# Patient Record
Sex: MALE | Race: BLACK OR AFRICAN AMERICAN | Employment: OTHER | URBAN - METROPOLITAN AREA
[De-identification: names, ages, dates, MRNs, and addresses within clinical notes are randomized per-mention and may not be internally consistent; named-entity substitution may affect disease eponyms.]

---

## 2017-02-02 ENCOUNTER — TELEPHONE (OUTPATIENT)
Dept: CARDIOLOGY CLINIC | Age: 62
End: 2017-02-02

## 2017-02-02 NOTE — TELEPHONE ENCOUNTER
Faxed device clearance form to [facility] at number [phone] and [phone]. Fax conformation received from both.

## 2017-02-08 ENCOUNTER — OFFICE VISIT (OUTPATIENT)
Dept: CARDIOLOGY CLINIC | Age: 62
End: 2017-02-08

## 2017-02-08 ENCOUNTER — OFFICE VISIT (OUTPATIENT)
Dept: SURGERY | Age: 62
End: 2017-02-08

## 2017-02-08 VITALS
OXYGEN SATURATION: 96 % | DIASTOLIC BLOOD PRESSURE: 70 MMHG | SYSTOLIC BLOOD PRESSURE: 146 MMHG | RESPIRATION RATE: 16 BRPM | BODY MASS INDEX: 31.1 KG/M2 | HEART RATE: 64 BPM | WEIGHT: 210 LBS | TEMPERATURE: 98 F | HEIGHT: 69 IN

## 2017-02-08 VITALS
OXYGEN SATURATION: 98 % | HEART RATE: 68 BPM | BODY MASS INDEX: 31.1 KG/M2 | HEIGHT: 69 IN | DIASTOLIC BLOOD PRESSURE: 64 MMHG | WEIGHT: 210 LBS | RESPIRATION RATE: 16 BRPM | SYSTOLIC BLOOD PRESSURE: 120 MMHG

## 2017-02-08 DIAGNOSIS — E66.01 MORBID OBESITY DUE TO EXCESS CALORIES (HCC): Primary | ICD-10-CM

## 2017-02-08 DIAGNOSIS — I44.1 SECOND DEGREE AV BLOCK, MOBITZ TYPE II: Primary | ICD-10-CM

## 2017-02-08 DIAGNOSIS — Z98.84 LAP-BAND SURGERY STATUS: ICD-10-CM

## 2017-02-08 DIAGNOSIS — Z46.51 ENCOUNTER FOR FITTING AND ADJUSTMENT OF GASTRIC LAP BAND: ICD-10-CM

## 2017-02-08 NOTE — MR AVS SNAPSHOT
Visit Information Date & Time Provider Department Dept. Phone Encounter #  
 2/8/2017 10:40 AM Molina Sawyer MD [facility]ub[facility] Guajardo [phone] [id number] Your Appointments 2/8/2017  2:20 PM  
Any with Deyvi Beckford NP  
[facility] 22 876 ([address]) Appt Note: ep, band adjusted $0.0co , tc 02/06/2017 [address] [facility] 63 [facility] [address]  
[address] [phone]  
  
    
 3/30/2017  9:30 AM  
PACEMAKER with Romulo Marshall [facility] ([facility]) Appt Note: 1 year f/u with device check [address] [place]  
[phone]  
  
   
 [address]  
  
    
 3/30/2017  9:40 AM  
ESTABLISHED PATIENT with Lexi Eaton MD  
[facility] ([address]) Appt Note: 1 year f/u with device check [address]  [address] [place]  
[address] [address] Upcoming Health Maintenance Date Due Hepatitis C Screening 1955 Pneumococcal 19-64 Highest Risk (1 of 3 - PCV13) 7/25/1974 DTaP/Tdap/Td series (1 - Tdap) 7/25/1976 FOBT Q 1 YEAR AGE 50-75 7/25/2005 ZOSTER VACCINE AGE 60> 7/25/2015 INFLUENZA AGE 9 TO ADULT 8/1/2016 Allergies as of 2/8/2017  Review Complete On: 2/8/2017 By: Olivia Herring No Known Allergies Current Immunizations  Never Reviewed No immunizations on file. Not reviewed this visit You Were Diagnosed With   
  
 Codes Comments Second degree AV block, Mobitz type II    -  Primary ICD-10-CM: I44.1 ICD-9-CM: 426.12 Vitals BP Pulse Resp Height(growth percentile) Weight(growth percentile) SpO2  
 120/64 (BP 1 Location: Left arm, BP Patient Position: Sitting) 68 16 5' 9\" (1.753 m) 210 lb (95.3 kg) 98% BMI Smoking Status 31.01 kg/m2 Never Smoker BMI and BSA Data Body Mass Index Body Surface Area 31.01 kg/m 2 2.15 m 2 Preferred Pharmacy Pharmacy Name Phone [facility]Y [facility][id number] [place], [place] - [address] [phone] Your Updated Medication List  
  
   
This list is accurate as of: 2/8/17 12:03 PM.  Always use your most recent med list.  
  
  
  
  
 acetaminophen 325 mg tablet Commonly known as:  TYLENOL Take 2 Tabs by mouth every six (6) hours. Indications: PAIN  
  
 amLODIPine 5 mg tablet Commonly known as:  [place] TAKE 1 TABLET EVERY DAY  
  
 cholecalciferol (vitamin D3) 2,000 unit Tab Take  by mouth daily. FOLBEE PLUS Tab tablet Generic drug:  b complex-vitamin c-folic acid 5mg Take 1 Tab by mouth daily. metFORMIN 500 mg tablet Commonly known as:  GLUCOPHAGE  
250 mg two (2) times a day. mycophenolate mofetil 250 mg capsule Commonly known as:  CELLCEPT  
daily. One Touch Delica 33 gauge Misc Generic drug:  lancets ONETOUCH ULTRA TEST strip Generic drug:  glucose blood VI test strips  
  
 predniSONE 5 mg tablet Commonly known as:  DELTASONE  
5 mg daily. SENSIPAR 30 mg tablet Generic drug:  cinacalcet  
daily. tacrolimus 1 mg capsule Commonly known as:  PROGRAF Take 2 mg by mouth every twelve (12) hours. Anti rejection drug We Performed the Following AMB POC EKG ROUTINE W/ 12 LEADS, INTER & REP [95886 CPT(R)] Patient Instructions Follow up with Stefany Felix in 6 months Introducing [facility] & HE[facility] SERVICES! Geno Lowery introduces [employer] patient portal. Now you can access parts of your medical record, email your doctor's office, and request medication refills online. 1. In your internet browser, go to [email or web address] 2. Click on the First Time User? Click Here link in the Sign In box.  You will see the New Member Sign Up page. 3. Enter your [email or web address] Access Code exactly as it appears below. You will not need to use this code after youve completed the sign-up process. If you do not sign up before the expiration date, you must request a new code. · [email or web address] Access Code: [email or web address] Expires: 3/22/2017  9:10 AM 
 
4. Enter the last four digits of your Social Security Number (xxxx) and Date of Birth (mm/dd/yyyy) as indicated and click Submit. You will be taken to the next sign-up page. 5. Create a [email or web address] ID. This will be your [email or web address] login ID and cannot be changed, so think of one that is secure and easy to remember. 6. Create a [email or web address] password. You can change your password at any time. 7. Enter your Password Reset Question and Answer. This can be used at a later time if you forget your password. 8. Enter your e-mail address. You will receive e-mail notification when new information is available in [address] 9. Click Sign Up. You can now view and download portions of your medical record. 10. Click the Download Summary menu link to download a portable copy of your medical information. If you have questions, please visit the Frequently Asked Questions section of the [email or web address] website. Remember, [email or web address] is NOT to be used for urgent needs. For medical emergencies, dial 911. Now available from your iPhone and Android! Please provide this summary of care documentation to your next provider. Your primary care clinician is listed as [address]. If you have any questions after today's visit, please call [phone].

## 2017-02-08 NOTE — PROGRESS NOTES
1. Have you been to the ER, urgent care clinic since your last visit? Hospitalized since your last visit? No    2. Have you seen or consulted any other health care providers outside of the [address] [place] [address] since your last visit? Include any pap smears or colon screening.  No

## 2017-02-08 NOTE — MR AVS SNAPSHOT
Visit Information Date & Time Provider Department Dept. Phone Encounter #  
 2/8/2017  2:20 PM Poornima Manzanares NP [address] [phone] [id number] Follow-up Instructions Return in about 6 weeks (around 3/22/2017). Your Appointments 3/30/2017  9:30 AM  
PACEMAKER with [facility]ER[facility] ([facility] [facility]) Appt Note: 1 year f/u with device check [address] [place] [address]  
[phone]  
  
   
 [address]  
  
    
 3/30/2017  9:40 AM  
ESTABLISHED PATIENT with Deepthi Sanchez MD  
[facility] Appt Note: 1 year f/u with device check [address] [address]  
[address] [address]  
  
    
 8/11/2017  8:20 AM  
ESTABLISHED PATIENT with Kayla Arauz MD  
[facility] ([facility] Appt Note: 6 Month Follow Up  
 [address]  
[address] [address] Upcoming Health Maintenance Date Due Hepatitis C Screening 1955 Pneumococcal 19-64 Highest Risk (1 of 3 - PCV13) 7/25/1974 DTaP/Tdap/Td series (1 - Tdap) 7/25/1976 FOBT Q 1 YEAR AGE 50-75 7/25/2005 ZOSTER VACCINE AGE 60> 7/25/2015 INFLUENZA AGE 9 TO ADULT 8/1/2016 Allergies as of 2/8/2017  Review Complete On: 2/8/2017 By: Poornima Manzanares NP No Known Allergies Current Immunizations  Never Reviewed No immunizations on file. Not reviewed this visit You Were Diagnosed With   
  
 Codes Comments Morbid obesity due to excess calories ([facility] [address]    -  Primary ICD-10-CM: E66.01 
ICD-9-CM: 278.01   
 LAP-BAND surgery status     ICD-10-CM: Z98.84 ICD-9-CM: V45.86   
 Encounter for fitting and adjustment of gastric lap band     ICD-10-CM: Z46.51 
ICD-9-CM: V53.51 BMI 31.0-31.9,adult     ICD-10-CM: Z68.31 
ICD-9-CM: V85.31 Vitals BP Pulse Temp Resp Height(growth percentile) Weight(growth percentile) 146/70 (BP 1 Location: Left arm, BP Patient Position: Sitting) 64 98 °F (36.7 °C) (Oral) 16 5' 9\" (1.753 m) 210 lb (95.3 kg) SpO2 BMI Smoking Status 96% 31.01 kg/m2 Never Smoker BMI and BSA Data Body Mass Index Body Surface Area 31.01 kg/m 2 2.15 m 2 Preferred Pharmacy Pharmacy Name Phone [facility]/PH[facility]ACY #[id number]- [place], [place] - [address] [phone] Your Updated Medication List  
  
   
This list is accurate as of: 2/8/17  2:58 PM.  Always use your most recent med list.  
  
  
  
  
 acetaminophen 325 mg tablet Commonly known as:  TYLENOL Take 2 Tabs by mouth every six (6) hours. Indications: PAIN  
  
 amLODIPine 5 mg tablet Commonly known as:  [place] TAKE 1 TABLET EVERY DAY  
  
 cholecalciferol (vitamin D3) 2,000 unit Tab Take  by mouth daily. FOLBEE PLUS Tab tablet Generic drug:  b complex-vitamin c-folic acid 5mg Take 1 Tab by mouth daily. metFORMIN 500 mg tablet Commonly known as:  GLUCOPHAGE  
250 mg two (2) times a day. mycophenolate mofetil 250 mg capsule Commonly known as:  CELLCEPT  
daily. One Touch Delica 33 gauge Misc Generic drug:  lancets ONETOUCH ULTRA TEST strip Generic drug:  glucose blood VI test strips  
  
 predniSONE 5 mg tablet Commonly known as:  DELTASONE  
5 mg daily. SENSIPAR 30 mg tablet Generic drug:  cinacalcet  
daily. tacrolimus 1 mg capsule Commonly known as:  PROGRAF Take 2 mg by mouth every twelve (12) hours. Anti rejection drug We Performed the Following ADJUSTMENT GASTRIC BAND [ [facility]] Follow-up Instructions Return in about 6 weeks (around 3/22/2017). Patient Instructions Post Lap Band Adjustment Instructions Your band is now more restrictive. Some swelling can occur at the band following a fill. Therefore, you should eat: 
 
Full liquid diet for 12-24 hours, then Soft and mushy foods for 2 days, then Resume regular food on the 4 th day. All meals should fit in a 4 ounce cup. (1/2 cup container) Choose food that require chewing, ideally foods rich in fiber and protein Avoid fatty and sugary food. Avoid snack food items. Eating Tips: 
Put down the fork between bites. Do not talk and eat at the same time. No drinking 30 minutes before or one hour after a meal. 
 
Call for an evaluation if you develop new reflux (heartburn), a night time cough or inability to tolerate solid foods. Our next regular follow- up appointment Introducing [facility] & HE[facility]! [facility] introduces [employer] patient portal. Now you can access parts of your medical record, email your doctor's office, and request medication refills online. 1. In your internet browser, go to [email or web address] 2. Click on the First Time User? Click Here link in the Sign In box. You will see the New Member Sign Up page. 3. Enter your [employer] Access Code exactly as it appears below. You will not need to use this code after youve completed the sign-up process. If you do not sign up before the expiration date, you must request a new code. · [employer] Access Code: [email or web address] Expires: 3/22/2017  9:10 AM 
 
4. Enter the last four digits of your Social Security Number (xxxx) and Date of Birth (mm/dd/yyyy) as indicated and click Submit. You will be taken to the next sign-up page. 5. Create a EsLifet ID. This will be your [employer] login ID and cannot be changed, so think of one that is secure and easy to remember. 6. Create a EsLifet password. You can change your password at any time. 7. Enter your Password Reset Question and Answer. This can be used at a later time if you forget your password. 8. Enter your e-mail address. You will receive e-mail notification when new information is available in [address] 9. Click Sign Up. You can now view and download portions of your medical record. 10. Click the Download Summary menu link to download a portable copy of your medical information. If you have questions, please visit the Frequently Asked Questions section of the Lyft website. Remember, [employer] is NOT to be used for urgent needs. For medical emergencies, dial 911. Now available from your iPhone and Android! Please provide this summary of care documentation to your next provider. Your primary care clinician is listed as [address]. If you have any questions after today's visit, please call [phone].

## 2017-02-08 NOTE — PATIENT INSTRUCTIONS
Post Lap Band Adjustment Instructions  Your band is now more restrictive. Some swelling can occur at the band following a fill. Therefore, you should eat:    Full liquid diet for 12-24 hours, then  Soft and mushy foods for 2 days, then  Resume regular food on the 4 th day. All meals should fit in a 4 ounce cup. (1/2 cup container)  Choose food that require chewing, ideally foods rich in fiber and protein    Avoid fatty and sugary food. Avoid snack food items. Eating Tips:  Put down the fork between bites. Do not talk and eat at the same time. No drinking 30 minutes before or one hour after a meal.    Call for an evaluation if you develop new reflux (heartburn), a night time cough or inability to tolerate solid foods.     Our next regular follow- up appointment

## 2017-02-08 NOTE — PROGRESS NOTES
Merly Cruz is a 64 y.o. male 8 years status post lap gastric banding, down 72 pounds. Weight today is 210 pounds. Patient stated wants to get back on track with weight loss. \" I have been eating badly and not exercising. \"  No nausea/vomiting, no heartburn/reflux, no night-time cough, no fever, no chills, No shortness of breath, no chest pain, no abdominal pain. Denies dysphagia. Portion control but no measuring meals. Snacking on chips and sweets. Drinking at least 40 ounces of water daily. Admits to eating/drinking at the same time. Bowel movements daily that are formed. Patient states he does need an adjustment because having hunger and portions are larger. HPI    Review of Systems   Constitutional: Negative for chills, fever and malaise/fatigue. Respiratory: Negative for cough, sputum production and shortness of breath. Cardiovascular: Negative for chest pain, palpitations and leg swelling. Gastrointestinal: Negative for abdominal pain, constipation, diarrhea, heartburn, nausea and vomiting. Genitourinary: Negative for dysuria. Neurological: Negative for dizziness. Physical Exam   Constitutional: He is oriented to person, place, and time. He appears well-developed and well-nourished. No distress. Abdominal: Soft. Bowel sounds are normal. He exhibits no distension. There is no tenderness. There is no rebound and no guarding. Lap sites healed. Port is palpable   Musculoskeletal: Normal range of motion. He exhibits no edema. Neurological: He is alert and oriented to person, place, and time. Skin: Skin is warm and dry. No rash noted. No erythema. Psychiatric: He has a normal mood and affect. His behavior is normal. Thought content normal.       ASSESSMENT and PLAN  Morbid Obesity 8 years status post lap gastric banding, down 72 pounds. Weight today is 210 pounds. With verbal consent an office adjustment was performed today. Patient was placed in standing position.  Abdomen was prepped using sterile technique, Suh needle easily accessed band. A total of 6 ml of normal saline was found in band, an additional .75 ml was added to band. Patient has 6.75 ml of normal saline in band. Patient then tolerated 6-8 oz fluid bolus with productive belch. Advised to stay on full diet for 12-24 hours, then advance diet as tolerated. If developed nausea, vomiting, dysphagia, heartburn, reflux, and/or nighttime cough advised to call office for possible removal of some fluid out of band. Yet if tolerating diet patient to follow up in office in 4 to 6 weeks. Advised patient to continue with diet that is high-protein, low-fat, low-sugar, and measuring 4 ounces at each meal. Also continue hydrating with at least  40 ounces of no-calorie, non-carbonated beverages. Advised to call office if any questions/concerns.

## 2017-02-08 NOTE — PROGRESS NOTES
HISTORY OF PRESENT ILLNESS  Yehuda Luke is a 64 y.o. male. Ms. Brien Aguirre is a very pleasant 64years old gentleman with a past medical history remarkable for hypertension, insulin dependent diabetes mellitus, renal insufficiency on hemodialysis, who returns today for follow up. The patient has undergone successful laparoscopic band surgery without any problems. He has lost 170 pounds since 2008. He has received renal transplant in 2014   Echo on 12/14 At [facility], severe LVH, EF 60-65% trivial mr tr and ai  ETT on 12/14 at u negative for myocardium at ischemic risk   HOLTER on 9/24/15: NSR, FAV, BBB, 32-82. Frequent 2nd degree av block mobitz 1, rare pvc's and frequent pac's and rare bigeminy    Following this with Dr. Marlen Palm: Implantation of dual-chamber pacemaker with contrast venography and fluoroscopy. current pacemaker mode and does not need RA lead reposition  He needs V pacing only  HPI  No cp or sob doing very well  Exercising at least 3-4 times a week on TM with no symptoms at all  Review of Systems   Respiratory: Negative. Cardiovascular: Negative. Visit Vitals    /64 (BP 1 Location: Left arm, BP Patient Position: Sitting)    Pulse 68    Resp 16    Ht 5' 9\" (1.753 m)    Wt 95.3 kg (210 lb)    SpO2 98%    BMI 31.01 kg/m2       Physical Exam   Neck: No JVD present. Carotid bruit is not present. Cardiovascular: Normal rate and regular rhythm. Pulmonary/Chest: Effort normal and breath sounds normal.   Abdominal: Soft. Musculoskeletal: He exhibits no edema. Psychiatric: He has a normal mood and affect. Current Outpatient Prescriptions on File Prior to Visit   Medication Sig Dispense Refill    amLODIPine (NORVASC) 5 mg tablet TAKE 1 TABLET EVERY DAY 30 Tab 5    SENSIPAR 30 mg tablet daily. 3    mycophenolate (CELLCEPT) 250 mg capsule daily.  acetaminophen (TYLENOL) 325 mg tablet Take 2 Tabs by mouth every six (6) hours. Indications: PAIN 30 Tab 0    ONETOUCH ULTRA TEST strip       ONE TOUCH DELICA 33 gauge misc   1    metFORMIN (GLUCOPHAGE) 500 mg tablet 250 mg two (2) times a day.  cholecalciferol, vitamin D3, 2,000 unit tab Take  by mouth daily.  predniSONE (DELTASONE) 5 mg tablet 5 mg daily.  tacrolimus (PROGRAF) 1 mg capsule Take 2 mg by mouth every twelve (12) hours. Anti rejection drug      b complex-vitamin c-folic acid 5mg (FOLBEE PLUS) Tab tablet Take 1 Tab by mouth daily. No current facility-administered medications on file prior to visit. ASSESSMENT and PLAN  SSS: with PPM no particular issues. His ECG shows NSR and V paced. HTN: well controlled  HLD : closely followed by his PCP  Preop: patient remains completely asymptomatic cardiac wise.  Nuclear stress test a bit more than 2 years ago with no ischemia  At this time he is at an acceptable cardiac risk to proceed with penile prosthesis replacement as planned  No additional interventions for now  I will see him back in 6 months or sooner if any issues

## 2017-04-05 ENCOUNTER — HOSPITAL ENCOUNTER (OUTPATIENT)
Dept: GENERAL RADIOLOGY | Age: 62
Discharge: HOME | End: 2017-04-05
Payer: MEDICARE

## 2017-04-05 ENCOUNTER — TELEPHONE (OUTPATIENT)
Dept: CARDIOLOGY CLINIC | Age: 62
End: 2017-04-05

## 2017-04-05 ENCOUNTER — OFFICE VISIT (OUTPATIENT)
Dept: CARDIOLOGY CLINIC | Age: 62
End: 2017-04-05

## 2017-04-05 ENCOUNTER — CLINICAL SUPPORT (OUTPATIENT)
Dept: CARDIOLOGY CLINIC | Age: 62
End: 2017-04-05

## 2017-04-05 VITALS
SYSTOLIC BLOOD PRESSURE: 132 MMHG | HEART RATE: 60 BPM | HEIGHT: 69 IN | DIASTOLIC BLOOD PRESSURE: 64 MMHG | WEIGHT: 198 LBS | BODY MASS INDEX: 29.33 KG/M2

## 2017-04-05 DIAGNOSIS — I44.1 SECOND DEGREE AV BLOCK, MOBITZ TYPE II: ICD-10-CM

## 2017-04-05 DIAGNOSIS — Z95.0 S/P PLACEMENT OF CARDIAC PACEMAKER: ICD-10-CM

## 2017-04-05 DIAGNOSIS — Z94.0 RENAL TRANSPLANT RECIPIENT: ICD-10-CM

## 2017-04-05 DIAGNOSIS — Z95.0 CARDIAC PACEMAKER IN SITU: Primary | ICD-10-CM

## 2017-04-05 PROCEDURE — 71020 XR CHEST PA LAT: CPT

## 2017-04-05 NOTE — PROGRESS NOTES
Cardiac Electrophysiology Office Note     Subjective: Valdo Montesinos is a 64 y.o. man who is here today for follow up because Biotronik pacer    It is known no capture of atrial lead as xray had shown A lead dislodged in 2015 and was hanging in RA for sensing  He is s/p total knee replacement with Dr Hope Sanford 11/23/15. He had atrial lead dislodged on xray in Nov 2015 but does not need reposition after VDD mode allowed him to do well  He is doing okay after his knee replacement and is going to have left shoulder surgery coming up. He has no symptom change  No dizziness, syncope       S/p dual chamber pacemaker (Biotronik Setrox) 10/29/15. He has a history of kidney transplant. He has a right arm fistula. He lost about 157 pounds with diet. He has severe left knee arthritis and wanted to have surgery done. He had shortness of breath on exertion, tired easily. In the past when he went for surgery, it was postponed because of bradycardia. EKG show that he has first degree AV block and bifascicular block. He had a Holter monitor that showed second degree AV block with severe bradycardia.       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 3    mycophenolate (CELLCEPT) 250 mg capsule Take 250 mg by mouth daily.  acetaminophen (TYLENOL) 325 mg tablet Take 2 Tabs by mouth every six (6) hours. Indications: PAIN 30 Tab 0    ONETOUCH ULTRA TEST strip       ONE TOUCH DELICA 33 gauge misc   1    metFORMIN (GLUCOPHAGE) 500 mg tablet 250 mg two (2) times a day.  cholecalciferol, vitamin D3, 2,000 unit tab Take  by mouth daily.  predniSONE (DELTASONE) 5 mg tablet 5 mg daily.  tacrolimus (PROGRAF) 1 mg capsule Take 2 mg by mouth every twelve (12) hours. Anti rejection drug      b complex-vitamin c-folic acid 5mg (FOLBEE PLUS) Tab tablet Take 1 Tab by mouth daily. No Known Allergies  Past Medical History:   Diagnosis Date    Chronic pain     Diabetes ([address]     h/o Gastric band     h/o successful Renal transplant 2014    Hypertension     Pacemaker 10/2015     Past Surgical History:   Procedure Laterality Date    HX PACEMAKER  10/29/15    HX RENAL TRANSPLANT  2-16-14    [facility]    HX RENAL TRANSPLANT Right 2/2014    INS PPM/ICD LED DUAL ONLY  10/29/2015         LAP, PLACE ADJUST GASTR BAND      PENILE PUMP       No family history of pacemaker   Social History   Substance Use Topics    Smoking status: Never Smoker    Smokeless tobacco: Never Used    Alcohol use No        Review of Systems:   Constitutional: Negative for fever, chills, weight loss   HEENT: Negative for nosebleeds, vision changes. Respiratory: Negative for cough, hemoptysis, sputum production, and wheezing. Cardiovascular: Negative for chest pain, palpitations, orthopnea, claudication, leg swelling, syncope, and PND. Gastrointestinal: Negative for nausea, vomiting, diarrhea, constipation, blood in stool and melena. Genitourinary: Negative for dysuria, and hematuria. Musculoskeletal: Negative for myalgias,  arthralgia . Skin: Negative for rash. Heme: Does not bleed or bruise easily.    Neurological: Negative for speech change and focal weakness     Objective:     Visit Vitals    /64 (BP 1 Location: Left arm, BP Patient Position: Sitting)    Pulse 60    Ht 5' 9\" (1.753 m)    Wt 198 lb (89.8 kg)    BMI 29.24 kg/m2      Physical Exam:   Constitutional: well-developed and well-nourished. No distress. Head: Normocephalic and atraumatic. Eyes: Pupils are equal, round  Neck: supple. No JVD present. Cardiovascular: Normal rate, regular rhythm and normal heart sounds. Exam reveals no gallop and no friction rub. No murmur heard. Pulmonary/Chest: Effort normal and breath sounds normal. No wheezes. Abdominal: Soft, no tenderness. Musculoskeletal: no edema. right arm old AV fistula  Neurological: alert,oriented. Skin: Skin is warm and dry. Left side pacemaker site healed/ unremarkable,   Psychiatric: normal mood and affect. Behavior is normal. Judgment and thought content normal.          Assessment/Plan:       ICD-10-CM ICD-9-CM    1. Cardiac pacemaker in situ Z95.0 V45.01    2. S/P placement of cardiac pacemaker Z95.0 V45.01 XR CHEST PA LAT   3. Second degree AV block, Mobitz type II I44.1 426.12    4. Renal transplant recipient Z94.0 V42.0      reviewed pacemaker check and he is comfortable with keeping the current pacemaker mode and does not need RA lead reposition but it showed some high V rate and he said he usually exercise those time  They were 6 seconds  He needs V pacing only  His sinus node allows his rate response   I recommend to check xray to make sure RA lead did not fall to RV and if it does, will need to remove and replace  and he will see Dr Hiram Chris 6 months and me in a year if chest xray shows no change  If symptoms change he calls me  He checks pacer every 3 months remotely and once a year in office    Carlos Yeager M.D.   Electrophysiology/Cardiology  [facility]  [address] , [address]  [address] [facility] [address]  [phone]                                        [phone]

## 2017-04-05 NOTE — TELEPHONE ENCOUNTER
----- Message from Callum Burkett NP sent at 4/5/2017  4:33 PM EDT -----  Right atrial lead position the same at 11/2015  The RA lead was previously dislodged after knee surgery 11/2015  Atrial lead appropriately sensing   No further intervention at this time

## 2017-04-05 NOTE — PROGRESS NOTES
Chief Complaint   Patient presents with    Slow Heart Rate     second degree heart block    Pacemaker Check    Follow-up     annual follow up     Verified patient with two types of identifiers. Verified medications with the patient. Verified pharmacy with patient.

## 2017-04-05 NOTE — MR AVS SNAPSHOT
Visit Information Date & Time Provider Department Dept. Phone Encounter #  
 4/5/2017  9:40 AM Hope Quevedo MD [facility] [phone] [id number] Your Appointments 8/11/2017  8:20 AM  
ESTABLISHED PATIENT with Darcy Ortiz MD  
[facility] ([address]) Appt Note: 6 Month Follow Up  
 [address] [place]  
[address] [address]  
  
    
 4/27/2018  9:00 AM  
PACEMAKER with Areli Sellers [facility] ([facility]) Appt Note: bio threshold/rc/Dunn 1 yr b HM  
 [address] [place]  
[address] [facility]  
  
    
 4/27/2018  9:20 AM  
ESTABLISHED PATIENT with Hope Quevedo MD  
[facility] ([address]) Appt Note: bio threshold/rc/Dunn 1 yr b HM  
 [address] [facility] [address]  
[address] [address]  
  
    
  
 7/11/2017 10:00 AM  
REMOTE OFFICE VISIT with Marleen Segura [facility] ([facility]) Appt Note: HM PPI/rc b 4-5-17  
 [address] [place]  
[address] [address]  
  
    
 10/16/2017  9:00 AM  
REMOTE OFFICE VISIT with Marleen Segura [facility] ([facility]) Appt Note: HM PPI/rc b  
 [address] [place]  
[phone]  
  
    
 1/22/2018  8:15 AM  
REMOTE OFFICE VISIT with Marleen Segura [facility] ([facility]) Appt Note: HM PPI/rc b  
 [address] [place]  
[phone] Upcoming Health Maintenance Date Due Hepatitis C Screening 1955 Pneumococcal 19-64 Highest Risk (1 of 3 - PCV13) 7/25/1974 DTaP/Tdap/Td series (1 - Tdap) 7/25/1976 FOBT Q 1 YEAR AGE 50-75 7/25/2005 ZOSTER VACCINE AGE 60> 7/25/2015 INFLUENZA AGE 9 TO ADULT 8/1/2016 Allergies as of 4/5/2017  Review Complete On: 4/5/2017 By: Devin Chavez MD  
 No Known Allergies Current Immunizations  Never Reviewed No immunizations on file. Not reviewed this visit You Were Diagnosed With   
  
 Codes Comments S/P placement of cardiac pacemaker    -  Primary ICD-10-CM: Z95.0 ICD-9-CM: V45.01 Vitals BP Pulse Height(growth percentile) Weight(growth percentile) BMI Smoking Status 132/64 (BP 1 Location: Left arm, BP Patient Position: Sitting) 60 5' 9\" (1.753 m) 198 lb (89.8 kg) 29.24 kg/m2 Never Smoker Vitals History BMI and BSA Data Body Mass Index Body Surface Area  
 29.24 kg/m 2 2.09 m 2 Preferred Pharmacy Pharmacy Name Phone [id number] [address] [phone] Your Updated Medication List  
  
   
This list is accurate as of: 4/5/17 10:26 AM.  Always use your most recent med list.  
  
  
  
  
 acetaminophen 325 mg tablet Commonly known as:  TYLENOL Take 2 Tabs by mouth every six (6) hours. Indications: PAIN  
  
 amLODIPine 5 mg tablet Commonly known as:  Sherald Burkitt TAKE 1 TABLET EVERY DAY  
  
 cholecalciferol (vitamin D3) 2,000 unit Tab Take  by mouth daily. FOLBEE PLUS Tab tablet Generic drug:  b complex-vitamin c-folic acid 5mg Take 1 Tab by mouth daily. metFORMIN 500 mg tablet Commonly known as:  GLUCOPHAGE  
250 mg two (2) times a day. mycophenolate mofetil 250 mg capsule Commonly known as:  CELLCEPT Take 250 mg by mouth daily. One Touch Delica 33 gauge Misc Generic drug:  lancets ONETOUCH ULTRA TEST strip Generic drug:  glucose blood VI test strips  
  
 predniSONE 5 mg tablet Commonly known as:  DELTASONE  
5 mg daily. SENSIPAR 30 mg tablet Generic drug:  cinacalcet Take 30 mg by mouth daily. tacrolimus 1 mg capsule Commonly known as:  PROGRAF Take 2 mg by mouth every twelve (12) hours. Anti rejection drug To-Do List   
 Around 04/05/2017 Imaging:  XR CHEST PA LAT Patient Instructions Have chest x-ray to evaluate atrial lead placement. Introducing [facility]! Eder Moise introduces Samplesaint patient portal. Now you can access parts of your medical record, email your doctor's office, and request medication refills online. 1. In your internet browser, go to [email or web address] 2. Click on the First Time User? Click Here link in the Sign In box. You will see the New Member Sign Up page. 3. Enter your Samplesaint Access Code exactly as it appears below. You will not need to use this code after youve completed the sign-up process. If you do not sign up before the expiration date, you must request a new code. · Samplesaint Access Code: [email or web address] Expires: 7/4/2017 10:26 AM 
 
4. Enter the last four digits of your Social Security Number (xxxx) and Date of Birth (mm/dd/yyyy) as indicated and click Submit. You will be taken to the next sign-up page. 5. Create a Samplesaint ID. This will be your Samplesaint login ID and cannot be changed, so think of one that is secure and easy to remember. 6. Create a Samplesaint password. You can change your password at any time. 7. Enter your Password Reset Question and Answer. This can be used at a later time if you forget your password. 8. Enter your e-mail address. You will receive e-mail notification when new information is available in [address] 9. Click Sign Up. You can now view and download portions of your medical record. 10. Click the Download Summary menu link to download a portable copy of your medical information. If you have questions, please visit the Frequently Asked Questions section of the [employer] website. Remember, [employer] is NOT to be used for urgent needs. For medical emergencies, dial 911. Now available from your iPhone and Android! Please provide this summary of care documentation to your next provider. Your primary care clinician is listed as [address]. If you have any questions after today's visit, please call [phone].

## 2017-04-06 NOTE — TELEPHONE ENCOUNTER
Verified patient with two types of identifiers. Notified of results. Patient verbalizes understanding. And will call with any questions or concerns.

## 2017-04-25 ENCOUNTER — ANESTHESIA (OUTPATIENT)
Dept: ENDOSCOPY | Age: 62
End: 2017-04-25
Payer: MEDICARE

## 2017-04-25 ENCOUNTER — HOSPITAL ENCOUNTER (OUTPATIENT)
Age: 62
Setting detail: OUTPATIENT SURGERY
Discharge: HOME | End: 2017-04-25
Attending: SPECIALIST | Admitting: SPECIALIST
Payer: MEDICARE

## 2017-04-25 ENCOUNTER — ANESTHESIA EVENT (OUTPATIENT)
Dept: ENDOSCOPY | Age: 62
End: 2017-04-25
Payer: MEDICARE

## 2017-04-25 VITALS
WEIGHT: 198 LBS | RESPIRATION RATE: 19 BRPM | DIASTOLIC BLOOD PRESSURE: 80 MMHG | OXYGEN SATURATION: 97 % | HEART RATE: 70 BPM | TEMPERATURE: 97.8 F | BODY MASS INDEX: 31.08 KG/M2 | HEIGHT: 67 IN | SYSTOLIC BLOOD PRESSURE: 125 MMHG

## 2017-04-25 PROCEDURE — 88305 TISSUE EXAM BY PATHOLOGIST: CPT | Performed by: SPECIALIST

## 2017-04-25 PROCEDURE — 77030027957 HC TBNG IRR ENDOGTR BUSS -B: Performed by: SPECIALIST

## 2017-04-25 PROCEDURE — 74011250636 HC RX REV CODE- 250/636

## 2017-04-25 PROCEDURE — 76060000031 HC ANESTHESIA FIRST 0.5 HR: Performed by: SPECIALIST

## 2017-04-25 PROCEDURE — 77030009426 HC FCPS BIOP ENDOSC BSC -B: Performed by: SPECIALIST

## 2017-04-25 PROCEDURE — 76040000019: Performed by: SPECIALIST

## 2017-04-25 PROCEDURE — 74011000250 HC RX REV CODE- 250

## 2017-04-25 RX ORDER — ASPIRIN 81 MG/1
81 TABLET ORAL DAILY
COMMUNITY

## 2017-04-25 RX ORDER — EPINEPHRINE 0.1 MG/ML
1 INJECTION INTRACARDIAC; INTRAVENOUS
Status: DISCONTINUED | OUTPATIENT
Start: 2017-04-25 | End: 2017-04-25 | Stop reason: HOSPADM

## 2017-04-25 RX ORDER — SODIUM CHLORIDE 9 MG/ML
INJECTION, SOLUTION INTRAVENOUS
Status: DISCONTINUED | OUTPATIENT
Start: 2017-04-25 | End: 2017-04-25 | Stop reason: HOSPADM

## 2017-04-25 RX ORDER — MIDAZOLAM HYDROCHLORIDE 1 MG/ML
.25-1 INJECTION, SOLUTION INTRAMUSCULAR; INTRAVENOUS
Status: DISCONTINUED | OUTPATIENT
Start: 2017-04-25 | End: 2017-04-25 | Stop reason: HOSPADM

## 2017-04-25 RX ORDER — FENTANYL CITRATE 50 UG/ML
200 INJECTION, SOLUTION INTRAMUSCULAR; INTRAVENOUS
Status: DISCONTINUED | OUTPATIENT
Start: 2017-04-25 | End: 2017-04-25 | Stop reason: HOSPADM

## 2017-04-25 RX ORDER — DEXTROMETHORPHAN/PSEUDOEPHED 2.5-7.5/.8
1.2 DROPS ORAL
Status: DISCONTINUED | OUTPATIENT
Start: 2017-04-25 | End: 2017-04-25 | Stop reason: HOSPADM

## 2017-04-25 RX ORDER — LIDOCAINE HYDROCHLORIDE 20 MG/ML
INJECTION, SOLUTION EPIDURAL; INFILTRATION; INTRACAUDAL; PERINEURAL AS NEEDED
Status: DISCONTINUED | OUTPATIENT
Start: 2017-04-25 | End: 2017-04-25 | Stop reason: HOSPADM

## 2017-04-25 RX ORDER — NALOXONE HYDROCHLORIDE 0.4 MG/ML
0.4 INJECTION, SOLUTION INTRAMUSCULAR; INTRAVENOUS; SUBCUTANEOUS
Status: DISCONTINUED | OUTPATIENT
Start: 2017-04-25 | End: 2017-04-25 | Stop reason: HOSPADM

## 2017-04-25 RX ORDER — SODIUM CHLORIDE 0.9 % (FLUSH) 0.9 %
5-10 SYRINGE (ML) INJECTION AS NEEDED
Status: DISCONTINUED | OUTPATIENT
Start: 2017-04-25 | End: 2017-04-25 | Stop reason: HOSPADM

## 2017-04-25 RX ORDER — ATROPINE SULFATE 0.1 MG/ML
0.5 INJECTION INTRAVENOUS
Status: DISCONTINUED | OUTPATIENT
Start: 2017-04-25 | End: 2017-04-25 | Stop reason: HOSPADM

## 2017-04-25 RX ORDER — SODIUM CHLORIDE 0.9 % (FLUSH) 0.9 %
5-10 SYRINGE (ML) INJECTION EVERY 8 HOURS
Status: DISCONTINUED | OUTPATIENT
Start: 2017-04-25 | End: 2017-04-25 | Stop reason: HOSPADM

## 2017-04-25 RX ORDER — PROPOFOL 10 MG/ML
INJECTION, EMULSION INTRAVENOUS AS NEEDED
Status: DISCONTINUED | OUTPATIENT
Start: 2017-04-25 | End: 2017-04-25 | Stop reason: HOSPADM

## 2017-04-25 RX ORDER — SODIUM CHLORIDE 9 MG/ML
50 INJECTION, SOLUTION INTRAVENOUS CONTINUOUS
Status: DISCONTINUED | OUTPATIENT
Start: 2017-04-25 | End: 2017-04-25 | Stop reason: HOSPADM

## 2017-04-25 RX ORDER — FLUMAZENIL 0.1 MG/ML
0.2 INJECTION INTRAVENOUS
Status: DISCONTINUED | OUTPATIENT
Start: 2017-04-25 | End: 2017-04-25 | Stop reason: HOSPADM

## 2017-04-25 RX ADMIN — PROPOFOL 25 MG: 10 INJECTION, EMULSION INTRAVENOUS at 09:28

## 2017-04-25 RX ADMIN — PROPOFOL 10 MG: 10 INJECTION, EMULSION INTRAVENOUS at 09:38

## 2017-04-25 RX ADMIN — PROPOFOL 25 MG: 10 INJECTION, EMULSION INTRAVENOUS at 09:27

## 2017-04-25 RX ADMIN — PROPOFOL 25 MG: 10 INJECTION, EMULSION INTRAVENOUS at 09:34

## 2017-04-25 RX ADMIN — PROPOFOL 50 MG: 10 INJECTION, EMULSION INTRAVENOUS at 09:23

## 2017-04-25 RX ADMIN — PROPOFOL 25 MG: 10 INJECTION, EMULSION INTRAVENOUS at 09:31

## 2017-04-25 RX ADMIN — LIDOCAINE HYDROCHLORIDE 40 MG: 20 INJECTION, SOLUTION EPIDURAL; INFILTRATION; INTRACAUDAL; PERINEURAL at 09:23

## 2017-04-25 RX ADMIN — SODIUM CHLORIDE: 9 INJECTION, SOLUTION INTRAVENOUS at 09:13

## 2017-04-25 NOTE — IP AVS SNAPSHOT
[address] 
[phone] Patient: Walt Wild MRN: [id number] DPT:6/22/[birth year]45 You are allergic to the following No active allergies Recent Documentation Height Weight BMI Smoking Status 1.702 m 89.8 kg 31.01 kg/m2 Never Smoker Emergency Contacts Name Discharge Info Relation Home Work Mobile Rachael Freeman DISCHARGE CAREGIVER [3] Spouse [3] [phone] About your hospitalization You were admitted on:  April 25, 2017 You last received care in the:  [facility] ENDOSCOPY You were discharged on:  April 25, 2017 Unit phone number:  [phone] Why you were hospitalized Your primary diagnosis was:  Not on File Providers Seen During Your Hospitalizations Provider Role Specialty Primary office phone Colleen Lopez MD Attending Provider Gastroenterology [phone] Your Primary Care Physician (PCP) Primary Care Physician Office Phone Office Fax Chele Jose Mariayusra [phone] Follow-up Information None Current Discharge Medication List  
  
CONTINUE these medications which have NOT CHANGED Dose & Instructions Dispensing Information Comments Morning Noon Evening Bedtime  
 acetaminophen 325 mg tablet Commonly known as:  TYLENOL Your last dose was: Your next dose is:    
   
   
 Dose:  650 mg Take 2 Tabs by mouth every six (6) hours. Indications: PAIN Quantity:  30 Tab Refills:  0  
     
   
   
   
  
 amLODIPine 5 mg tablet Commonly known as:  Hallie Humza Your last dose was: Your next dose is: TAKE 1 TABLET EVERY DAY Quantity:  30 Tab Refills:  5  
     
   
   
   
  
 aspirin delayed-release 81 mg tablet Your last dose was: Your next dose is:    
   
   
 Dose:  81 mg Take 81 mg by mouth daily. Refills:  0  
     
   
   
   
  
 cholecalciferol (vitamin D3) 2,000 unit Tab Your last dose was: Your next dose is: Take  by mouth daily. Refills:  0  
     
   
   
   
  
 FOLBEE PLUS Tab tablet Generic drug:  b complex-vitamin c-folic acid 5mg Your last dose was: Your next dose is:    
   
   
 Dose:  1 Tab Take 1 Tab by mouth daily. Refills:  0  
     
   
   
   
  
 metFORMIN 500 mg tablet Commonly known as:  GLUCOPHAGE Your last dose was: Your next dose is:    
   
   
 Dose:  250 mg  
250 mg two (2) times a day. Refills:  0  
     
   
   
   
  
 mycophenolate mofetil 250 mg capsule Commonly known as:  CELLCEPT Your last dose was: Your next dose is:    
   
   
 Dose:  250 mg Take 250 mg by mouth daily. Refills:  0 One Touch Delica 33 gauge Misc Generic drug:  lancets Your last dose was: Your next dose is:    
   
   
  Refills:  1 ONETOUCH ULTRA TEST strip Generic drug:  glucose blood VI test strips Your last dose was: Your next dose is:    
   
   
  Refills:  0  
     
   
   
   
  
 predniSONE 5 mg tablet Commonly known as:  Leanord Pian Your last dose was: Your next dose is:    
   
   
 Dose:  5 mg  
5 mg daily. Refills:  0 SENSIPAR 30 mg tablet Generic drug:  cinacalcet Your last dose was: Your next dose is:    
   
   
 Dose:  30 mg Take 30 mg by mouth daily. Refills:  3  
     
   
   
   
  
 tacrolimus 1 mg capsule Commonly known as:  PROGRAF Your last dose was: Your next dose is:    
   
   
 Dose:  2 mg Take 2 mg by mouth every twelve (12) hours. Anti rejection drug Refills:  0 Discharge Instructions Walt Wild [id number] 
1955 COLON DISCHARGE INSTRUCTIONS Discomfort: Redness at IV site- apply warm compress to area; if redness or soreness persist- contact your physician There may be a slight amount of blood passed from the rectum Gaseous discomfort- walking, belching will help relieve any discomfort You may not operate a vehicle for 12 hours You may not engage in an occupation involving machinery or appliances for rest of today You may not drink alcoholic beverages for at least 12 hours Avoid making any critical decisions for at least 24 hour DIET: 
 Regular diet.  however -  remember your colon is empty and a heavy meal will produce gas. Avoid these foods:  vegetables, fried / greasy foods, carbonated drinks for today. MEDICATIONS: 
  
 Regarding Aspirin or Nonsteroidal medications, please see below. ACTIVITY: 
You may resume your normal daily activities it is recommended that you spend the remainder of the day resting -  avoid any strenuous activity. CALL M.D. ANY SIGN OF: Increasing pain, nausea, vomiting Abdominal distension (swelling) New increased bleeding (oral or rectal) Fever (chills) Pain in chest area Bloody discharge from nose or mouth Shortness of breath You may not  take any Advil, Aspirin, Ibuprofen, Motrin, Aleve, or Goodys for 7 days, ONLY  Tylenol as needed for pain. Follow-up Instructions: 
 Call Dr. Treva Lewis Results of procedure / biopsy in 10 days Telephone #  [phone] DISCHARGE SUMMARY from Nurse The following personal items collected during your admission are returned to you:  
Dental Appliance: Dental Appliances: None Vision: Visual Aid: None Hearing Aid:   
Jewelry:   
Clothing:   
Other Valuables:   
Valuables sent to safe:   
 
 
 
 
  
Colon Polyps: Care Instructions Your Care Instructions Colon polyps are growths in the colon or the rectum. The cause of most colon polyps is not known, and most people who get them do not have any problems. But a certain kind can turn into cancer.  For this reason, regular testing for colon polyps is important for people age 48 and older and anyone who has an increased risk for colon cancer. Polyps are usually found through routine colon cancer screening tests. Although most colon polyps are not cancerous, they are usually removed and then tested for cancer. Screening for colon cancer saves lives because the cancer can usually be cured if it is caught early. If you have a polyp that is the type that can turn into cancer, you may need more tests to examine your entire colon. The doctor will remove any other polyps that he or she finds, and you will be tested more often. Follow-up care is a key part of your treatment and safety. Be sure to make and go to all appointments, and call your doctor if you are having problems. It's also a good idea to know your test results and keep a list of the medicines you take. How can you care for yourself at home? Regular exams to look for colon polyps are the best way to prevent polyps from turning into colon cancer. These can include stool tests, sigmoidoscopy, colonoscopy, and CT colonography. Talk with your doctor about a testing schedule that is right for you. To prevent polyps There is no home treatment that can prevent colon polyps. But these steps may help lower your risk for cancer. · Stay active. Being active can help you get to and stay at a healthy weight. Try to exercise on most days of the week. Walking is a good choice. · Eat well. Choose a variety of vegetables, fruits, legumes (such as peas and beans), fish, poultry, and whole grains. · Do not smoke. If you need help quitting, talk to your doctor about stop-smoking programs and medicines. These can increase your chances of quitting for good. · If you drink alcohol, limit how much you drink. Limit alcohol to 2 drinks a day for men and 1 drink a day for women. When should you call for help? Call your doctor now or seek immediate medical care if: · You have severe belly pain. · Your stools are maroon or very bloody. Watch closely for changes in your health, and be sure to contact your doctor if: 
· You have a fever. · You have nausea or vomiting. · You have a change in bowel habits (new constipation or diarrhea). · Your symptoms get worse or are not improving as expected. Where can you learn more? Go to http://jolanta-bailey.info/. Enter [id number] in the search box to learn more about \"Colon Polyps: Care Instructions. \" Current as of: August 24, 2016 Content Version: 11.2 © 6591-0971 [employer]. Care instructions adapted under license by [employer] (which disclaims liability or warranty for this information). If you have questions about a medical condition or this instruction, always ask your healthcare professional. [address] any warranty or liability for your use of this information. Discharge Orders None Introducing [facility]! Paul Cortes introduces [employer] patient portal. Now you can access parts of your medical record, email your doctor's office, and request medication refills online. 1. In your internet browser, go to [email or web address]. [email or web address] 2. Click on the First Time User? Click Here link in the Sign In box. You will see the New Member Sign Up page. 3. Enter your [employer] Access Code exactly as it appears below. You will not need to use this code after youve completed the sign-up process. If you do not sign up before the expiration date, you must request a new code. · [employer] Access Code: [email or web address] Expires: 7/4/2017 10:26 AM 
 
4. Enter the last four digits of your Social Security Number (xxxx) and Date of Birth (mm/dd/yyyy) as indicated and click Submit. You will be taken to the next sign-up page. 5. Create a [employer] ID.  This will be your [employer] login ID and cannot be changed, so think of one that is secure and easy to remember. 6. Create a docBeat password. You can change your password at any time. 7. Enter your Password Reset Question and Answer. This can be used at a later time if you forget your password. 8. Enter your e-mail address. You will receive e-mail notification when new information is available in [address] 9. Click Sign Up. You can now view and download portions of your medical record. 10. Click the Download Summary menu link to download a portable copy of your medical information. If you have questions, please visit the Frequently Asked Questions section of the [employer]Beat website. Remember, [employer]Beat is NOT to be used for urgent needs. For medical emergencies, dial 911. Now available from your iPhone and Android! General Information Please provide this summary of care documentation to your next provider. Patient Signature:  ____________________________________________________________ Date:  ____________________________________________________________  
  
Marlena Burn Provider Signature:  ____________________________________________________________ Date:  ____________________________________________________________

## 2017-04-25 NOTE — ANESTHESIA PREPROCEDURE EVALUATION
Anesthetic History   No history of anesthetic complications            Review of Systems / Medical History  Patient summary reviewed, nursing notes reviewed and pertinent labs reviewed    Pulmonary  Within defined limits                 Neuro/Psych   Within defined limits           Cardiovascular  Within defined limits  Hypertension        Dysrhythmias            GI/Hepatic/Renal  Within defined limits              Endo/Other  Within defined limits  Diabetes         Other Findings              Physical Exam    Airway  Mallampati: II  TM Distance: > 6 cm  Neck ROM: normal range of motion   Mouth opening: Normal     Cardiovascular  Regular rate and rhythm,  S1 and S2 normal,  no murmur, click, rub, or gallop             Dental  No notable dental hx       Pulmonary  Breath sounds clear to auscultation               Abdominal  GI exam deferred       Other Findings            Anesthetic Plan    ASA: 2  Anesthesia type: MAC          Induction: Intravenous  Anesthetic plan and risks discussed with: Patient

## 2017-04-25 NOTE — PROGRESS NOTES
Charmaine Martinez  1955  [id number]    Situation:  Verbal report received from: SANTIAGO FLORIAN RN  Procedure: Procedure(s):  COLONOSCOPY  ENDOSCOPIC POLYPECTOMY    Background:    Preoperative diagnosis: COLON POLYPS  Postoperative diagnosis: Colon Polyp    :  Dr. Leobardo Kelley  Assistant(s): Endoscopy Technician-1: Thad Beltre IV  Endoscopy RN-1: Phil Rodriguez RN    Specimens:   ID Type Source Tests Collected by Time Destination   1 : Ascending Colon Polyp Preservative   Sarah Acevedo MD 4/25/2017 [address] Pathology     H. Pylori  no    Assessment:  Intra-procedure medications     Anesthesia gave intra-procedure sedation and medications, see anesthesia flow sheet yes    Intravenous fluids: NS@ KVO     Vital signs stable YES    Abdominal assessment: round and soft YES    Recommendation:  Discharge patient per MD order YES.   Return to floor N/A  Family or Friend YES  Permission to share finding with family or friend yes

## 2017-04-25 NOTE — PROCEDURES
[address]                 Colonoscopy Procedure Note    Indications:   See Preoperative Diagnosis above  Referring Physician: Vanda Quinn MD  Anesthesia/Sedation: MAC anesthesia Propofol  Endoscopist:  Dr. Jose A Ortiz  Assistant:  Endoscopy Technician-1: Marsha Can IV  Endoscopy RN-1: Shashi Reynolds RN  Preoperative diagnosis: COLON POLYPS  Postoperative diagnosis: Colon Polyp    Procedure in Detail:  Informed consent was obtained for the procedure, including sedation. Risks of perforation, hemorrhage, adverse drug reaction, and aspiration were discussed. The patient was placed in the left lateral decubitus position. Based on the pre-procedure assessment, including review of the patient's medical history, medications, allergies, and review of systems, he had been deemed to be an appropriate candidate for moderate sedation; he was therefore sedated with the medications listed above. The patient was monitored continuously with ECG tracing, pulse oximetry, blood pressure monitoring, and direct observations. A rectal examination was performed. The [id number] was inserted into the rectum and advanced under direct vision to the cecum, which was identified by the ileocecal valve and appendiceal orifice. The quality of the colonic preparation was good. A careful inspection was made as the colonoscope was withdrawn, including a retroflexed view of the rectum; findings and interventions are described below. Appropriate photodocumentation was obtained. Findings:  Rectum: normal  Sigmoid: normal  Descending Colon: normal  Transverse Colon: normal  Ascending Colon: 3 mm sessile polyp removed with cold biopsy  Cecum: normal    Specimens:    ascending colon polyp    EBL: None    Complications: None; patient tolerated the procedure well. Recommendations:     - Await pathology. - Repeat colonoscopy in 5 years.     Signed By: Goldie Sullivan MD                        April 25, 2017

## 2017-04-25 NOTE — H&P
Colonoscopy History and Physical      The patient was seen and examined. Date of last colonoscopy: 2012, Polyps  Yes      The airway was assessed and documented. The problem list, past medical history, and medications were reviewed. 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 Social History Main Topics    Smoking status: Never Smoker    Smokeless tobacco: Never Used    Alcohol use No    Drug use: No    Sexual activity: Not on file     Other Topics Concern    Not on file     Social History Narrative     Past Medical History:   Diagnosis Date    [facility]    Right    Cancer [facility])     prostate - radiation    Chronic kidney disease     Chronic pain     Diabetes ([facility] [address]     h/o Gastric band     h/o successful Renal transplant 2014    Hypertension     Pacemaker 10/2015         Prior to Admission Medications   Prescriptions Last Dose Informant Patient Reported? Taking? ONE TOUCH DELICA 33 gauge misc 1/81/8453 at Unknown time  Yes Yes   ONETOUCH ULTRA TEST strip 4/24/2017 at Unknown time  Yes Yes   SENSIPAR 30 mg tablet 4/24/2017 at Unknown time  Yes Yes   Sig: Take 30 mg by mouth daily. acetaminophen (TYLENOL) 325 mg tablet 4/11/2017  No No   Sig: Take 2 Tabs by mouth every six (6) hours.  Indications: PAIN   amLODIPine (NORVASC) 5 mg tablet 4/24/2017 at Unknown time  No Yes   Sig: TAKE 1 TABLET EVERY DAY   aspirin delayed-release 81 mg tablet 2017  Yes Yes   Sig: Take 81 mg by mouth daily. b complex-vitamin c-folic acid 5mg (FOLBEE PLUS) Tab tablet 2017 at Unknown time  Yes Yes   Sig: Take 1 Tab by mouth daily. cholecalciferol, vitamin D3, 2,000 unit tab 2017 at Unknown time  Yes Yes   Sig: Take  by mouth daily. metFORMIN (GLUCOPHAGE) 500 mg tablet 2017 at Unknown time  Yes Yes   Si mg two (2) times a day. mycophenolate (CELLCEPT) 250 mg capsule 2017 at Unknown time  Yes Yes   Sig: Take 250 mg by mouth daily. predniSONE (DELTASONE) 5 mg tablet 2017 at Unknown time  Yes Yes   Si mg daily. tacrolimus (PROGRAF) 1 mg capsule 2017 at Unknown time  Yes Yes   Sig: Take 2 mg by mouth every twelve (12) hours. Anti rejection drug      Facility-Administered Medications: None       The patient was seen and examined in the endoscopy suite. The airway was assessed and docuemented. The problem list and medications were reviewed. Chief complaint, history of present illness, and review of systems and Past medical History are positive for: HTN, CRF, renal transplant and colon polyps. The heart, lungs and mental status were satisfactory for the administration of sedation and for the procedure. I discussed with the patient the objectives, risks, consequences and alternatives to the procedure.      Plan: Endoscopic procedure with sedation     Jose A Ortiz MD   2017  9:24 AM

## 2017-04-25 NOTE — ANESTHESIA POSTPROCEDURE EVALUATION
Post-Anesthesia Evaluation and Assessment    Patient: Lesvia Srivastava MRN: [id number]  SSN: [id number]    YOB: 1955  Age: 64 y.o. Sex: male       Cardiovascular Function/Vital Signs  Visit Vitals    /63    Pulse 69    Temp 36.6 °C (97.8 °F)    Resp 16    Ht 5' 7\" (1.702 m)    Wt 89.8 kg (198 lb)    SpO2 96%    BMI 31.01 kg/m2       Patient is status post MAC anesthesia for Procedure(s):  COLONOSCOPY  ENDOSCOPIC POLYPECTOMY. Nausea/Vomiting: None    Postoperative hydration reviewed and adequate. Pain:  Pain Scale 1: Visual (04/25/17 0953)  Pain Intensity 1: 0 (04/25/17 0953)   Managed    Neurological Status: At baseline    Mental Status and Level of Consciousness: Arousable    Pulmonary Status:   O2 Device: Room air (04/25/17 0953)   Adequate oxygenation and airway patent    Complications related to anesthesia: None    Post-anesthesia assessment completed.  No concerns    Signed By: Fabricio Marlow MD     April 25, 2017

## 2017-04-25 NOTE — DISCHARGE INSTRUCTIONS
Hailey Garcia  [id number]  1955    COLON DISCHARGE INSTRUCTIONS  Discomfort:  Redness at IV site- apply warm compress to area; if redness or soreness persist- contact your physician  There may be a slight amount of blood passed from the rectum  Gaseous discomfort- walking, belching will help relieve any discomfort  You may not operate a vehicle for 12 hours  You may not engage in an occupation involving machinery or appliances for rest of today  You may not drink alcoholic beverages for at least 12 hours  Avoid making any critical decisions for at least 24 hour  DIET:   Regular diet. - however -  remember your colon is empty and a heavy meal will produce gas. Avoid these foods:  vegetables, fried / greasy foods, carbonated drinks for today. MEDICATIONS:      Regarding Aspirin or Nonsteroidal medications, please see below. ACTIVITY:  You may resume your normal daily activities it is recommended that you spend the remainder of the day resting -  avoid any strenuous activity. CALL M.D. ANY SIGN OF:  Increasing pain, nausea, vomiting  Abdominal distension (swelling)  New increased bleeding (oral or rectal)  Fever (chills)  Pain in chest area  Bloody discharge from nose or mouth  Shortness of breath    You may not  take any Advil, Aspirin, Ibuprofen, Motrin, Aleve, or Goodys for 7 days, ONLY  Tylenol as needed for pain. Follow-up Instructions:   Call Dr. Molly Muñoz  Results of procedure / biopsy in 10 days  Telephone #  [phone]      DISCHARGE SUMMARY from Nurse    The following personal items collected during your admission are returned to you:   Dental Appliance: Dental Appliances: None  Vision: Visual Aid: None  Hearing Aid:    Jewelry:    Clothing:    Other Valuables:    Valuables sent to safe:               Colon Polyps: Care Instructions  Your Care Instructions    Colon polyps are growths in the colon or the rectum.  The cause of most colon polyps is not known, and most people who get them do not have any problems. But a certain kind can turn into cancer. For this reason, regular testing for colon polyps is important for people age 48 and older and anyone who has an increased risk for colon cancer. Polyps are usually found through routine colon cancer screening tests. Although most colon polyps are not cancerous, they are usually removed and then tested for cancer. Screening for colon cancer saves lives because the cancer can usually be cured if it is caught early. If you have a polyp that is the type that can turn into cancer, you may need more tests to examine your entire colon. The doctor will remove any other polyps that he or she finds, and you will be tested more often. Follow-up care is a key part of your treatment and safety. Be sure to make and go to all appointments, and call your doctor if you are having problems. It's also a good idea to know your test results and keep a list of the medicines you take. How can you care for yourself at home? Regular exams to look for colon polyps are the best way to prevent polyps from turning into colon cancer. These can include stool tests, sigmoidoscopy, colonoscopy, and CT colonography. Talk with your doctor about a testing schedule that is right for you. To prevent polyps  There is no home treatment that can prevent colon polyps. But these steps may help lower your risk for cancer. · Stay active. Being active can help you get to and stay at a healthy weight. Try to exercise on most days of the week. Walking is a good choice. · Eat well. Choose a variety of vegetables, fruits, legumes (such as peas and beans), fish, poultry, and whole grains. · Do not smoke. If you need help quitting, talk to your doctor about stop-smoking programs and medicines. These can increase your chances of quitting for good. · If you drink alcohol, limit how much you drink. Limit alcohol to 2 drinks a day for men and 1 drink a day for women.   When should you call for help?  Call your doctor now or seek immediate medical care if:  · You have severe belly pain. · Your stools are maroon or very bloody. Watch closely for changes in your health, and be sure to contact your doctor if:  · You have a fever. · You have nausea or vomiting. · You have a change in bowel habits (new constipation or diarrhea). · Your symptoms get worse or are not improving as expected. Where can you learn more? Go to http://jolanta-bailey.info/. Enter [id number] in the search box to learn more about \"Colon Polyps: Care Instructions. \"  Current as of: August 24, 2016  Content Version: 11.2  © 6059-2566 [employer]. Care instructions adapted under license by [employer] (which disclaims liability or warranty for this information). If you have questions about a medical condition or this instruction, always ask your healthcare professional. [address] any warranty or liability for your use of this information.

## 2017-05-01 ENCOUNTER — HOSPITAL ENCOUNTER (OUTPATIENT)
Dept: WOUND CARE | Age: 62
Discharge: HOME | End: 2017-05-01
Payer: MEDICARE

## 2017-05-01 PROBLEM — S31.20XA: Status: ACTIVE | Noted: 2017-05-01

## 2017-05-01 PROCEDURE — 11042 DBRDMT SUBQ TIS 1ST 20SQCM/<: CPT | Performed by: ORTHOPAEDIC SURGERY

## 2017-05-02 NOTE — H&P
Wound Center  History and Physical    DOS: 5/1/2017     Subjective:     Chief Complaint:  Jerome Daugherty is a 64 y.o.  diabetic male, s/p renal transplant who presents with a penile wound of about 6  weeks duration, after implantation of a penile implant. Referred by:  Dr. Jordana North    HPI:     Wound caused by: non-healed surgical wound for implant. Offloading wound: yes and n/a  Appetite: good  Wound associated pain: mild  Diabetic: Yes  Smoker: No  ROS: no N/V, no T/chills; no local rash      Past Medical History:   Diagnosis Date    A-V fistula ([facility] [address] 2010    Right    Cancer [facility])     prostate - radiation    Chronic kidney disease     Chronic pain     Diabetes ([facility]     h/o Gastric band     h/o successful Renal transplant 2014    Hypertension     Pacemaker 10/2015      Past Surgical History:   Procedure Laterality Date    COLONOSCOPY N/A 4/25/2017    COLONOSCOPY performed by Mabeline Rubinstein, MD at [address] HX PACEMAKER  10/29/15    not a defrillator    HX RENAL TRANSPLANT  2-16-14    [facility]    HX RENAL TRANSPLANT Right 2/2014    INS PPM/ICD LED DUAL ONLY  10/29/2015         LAP, PLACE ADJUST GASTR BAND      PENILE PUMP      twice - last 2/2017     Family History   Problem Relation Age of Onset    Anesth Problems Neg Hx       Social History   Substance Use Topics    Smoking status: Never Smoker    Smokeless tobacco: Never Used    Alcohol use No       Prior to Admission medications    Medication Sig Start Date End Date Taking? Authorizing Provider   aspirin delayed-release 81 mg tablet Take 81 mg by mouth daily. Historical Provider   amLODIPine (NORVASC) 5 mg tablet TAKE 1 TABLET EVERY DAY 10/10/16   Kirsten Darling NP   SENSIPAR 30 mg tablet Take 30 mg by mouth daily. 4/7/16   Historical Provider   mycophenolate (CELLCEPT) 250 mg capsule Take 250 mg by mouth daily.  2/26/16   Historical Provider   acetaminophen (TYLENOL) 325 mg tablet Take 2 Tabs by mouth every six (6) hours. Indications: PAIN 11/24/15   Ivette Penny PA-C   ONETOUCH ULTRA TEST strip  9/21/15   Historical Provider   ONE TOUCH DELICA 33 gauge misc  0/7/37   Historical Provider   metFORMIN (GLUCOPHAGE) 500 mg tablet 250 mg two (2) times a day. 6/28/15   Historical Provider   cholecalciferol, vitamin D3, 2,000 unit tab Take  by mouth daily. Historical Provider   predniSONE (DELTASONE) 5 mg tablet 5 mg daily. 2/18/14   Historical Provider   tacrolimus (PROGRAF) 1 mg capsule Take 2 mg by mouth every twelve (12) hours. Anti rejection drug    Historical Provider   b complex-vitamin c-folic acid 5mg (FOLBEE PLUS) Tab tablet Take 1 Tab by mouth daily. Lakisha Jackson, MD     No Known Allergies     Review of Systems:  A comprehensive review of systems was negative except for that written in the History of Present Illness. and PMH. Objective:     Physical Exam:     T: 98.2 P: 71  RR: 16  BP: 129/78   General: well developed, well nourished, pleasant , NAD. Hygiene good  Psych: cooperative. Pleasant. No anxiety or depression. Normal mood and affect. Neuro: alert and oriented to person/place/situation. Otherwise nonfocal.  HEENT: Normocephalic, atraumatic. EOMI. Conjunctiva clear. No scleral icterus. Chest: Respirations nonlabored. Abdomen: Soft, nontender, nondistended. 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 Diabetic, renal transplant, with post-surgical wound. Plan:     Ulcer Debridement    Ulcer Number 1;  Penile woundTo Fat Layer    Character of Ulcer: New     Indication for Debridement: Necrotic tissue    Pre debridement measurements: 3.0 cm x 4.0 cm x 0.1 cm  Post - 2.3 x 4.0 x 0.2 cm  Risks of procedure were discussed with patient. Consent has been signed.      Anesthetic: Lidocaine 2% topical gel     Level of Debridement: Subctaneous Tissue     Tissue and/or Material removed: Skin, subQ    Type of Tissue: Non- Viable      Pre-debridement severity: Fat Layer Exposed     Post debridement severity: Fat Layer exposed    Instrument(s) used: Scalpel    Bleeding controlled with: Pressure    Estimated blood loss: Minimal    Post procedural pain: none    Patient tolerated procedure well. Dressing: Santyl, foam.   Frequency: daily    Patient understood and agrees with plan. Questions answered. Weekly visits and serial debridements also discussed.   Follow up with me in 1 week    Signed By: Olga Coombs MD     May 2, 2017

## 2017-05-08 ENCOUNTER — HOSPITAL ENCOUNTER (OUTPATIENT)
Dept: WOUND CARE | Age: 62
Discharge: HOME | End: 2017-05-08
Payer: MEDICARE

## 2017-05-08 RX ORDER — LIDOCAINE HYDROCHLORIDE 20 MG/ML
JELLY TOPICAL AS NEEDED
Status: DISCONTINUED | OUTPATIENT
Start: 2017-05-08 | End: 2017-05-12 | Stop reason: HOSPADM

## 2017-05-08 RX ADMIN — LIDOCAINE HYDROCHLORIDE: 20 JELLY TOPICAL at 08:42

## 2017-05-08 NOTE — PROGRESS NOTES
Wound Care Center  Progress Note     DOS: 5/8/2017      Subjective:      Chief Complaint:  Mena Zuñiga is a 64 y.o.  diabetic male, s/p renal transplant who presents with a penile wound of about 6 weeks duration, after implantation of a penile implant.     Referred by: Dr. Basim Muñoz     HPI:   Wound caused by: non-healed surgical wound for implant. Offloading wound: yes and n/a  Appetite: good  Wound associated pain: mild  Diabetic: Yes  Smoker: No  ROS: no N/V, no T/chills; no local rash     Physical Exam:      VSS, Afebrile   General: well developed, well nourished, pleasant , NAD. Hygiene good  Psych: cooperative. Pleasant. No anxiety or depression. Normal mood and affect. Neuro: alert and oriented to person/place/situation. Otherwise nonfocal.  HEENT: Normocephalic, atraumatic. EOMI. Conjunctiva clear. No scleral icterus. Chest: Respirations nonlabored. Abdomen: nondistended.     Penis Ulcer Description:   Etiology: combined  Measurement: 2.4 x 3.5 x 0.1 cm  Ulcer bed: Slough  Periwound: Normal  Exudate: Scant/small amount Serous exudate  Depth of Wound: To Fat Layer         Assessment:      64 y.o. 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 Diabetic, renal transplant, with post-surgical wound.     Plan:    Ulcer Debridement     Ulcer Number 1; Penile woundTo Fat Layer     Character of Ulcer: Smaller      Indication for Debridement: Slough     Risks of procedure were discussed with patient.  Consent has been signed.      Anesthetic: Lidocaine 2% topical gel      Level of Debridement: Subcutaneous      Tissue and/or Material removed: Slough     Type of Tissue: Non- Viable       Pre-debridement severity: Fat Layer Exposed      Post debridement severity: Fat Layer exposed     Instrument(s) used: 5 mm ring curette     Bleeding controlled with: Pressure     Estimated blood loss: Minimal     Post procedural pain: none     Patient tolerated procedure well.      Dressing: Santyl, foam.   Frequency: daily     Patient understood and agrees with plan. Questions answered.     Weekly visits and serial debridements also discussed.   Follow up with me in 1 week     Signed By: Olga Coombs MD

## 2017-05-15 ENCOUNTER — HOSPITAL ENCOUNTER (OUTPATIENT)
Dept: WOUND CARE | Age: 62
End: 2017-05-15
Payer: MEDICARE

## 2017-05-17 ENCOUNTER — HOSPITAL ENCOUNTER (OUTPATIENT)
Dept: WOUND CARE | Age: 62
Discharge: HOME | End: 2017-05-17
Payer: MEDICARE

## 2017-05-17 ENCOUNTER — OFFICE VISIT (OUTPATIENT)
Dept: SURGERY | Age: 62
End: 2017-05-17

## 2017-05-17 VITALS
BODY MASS INDEX: 28.49 KG/M2 | HEIGHT: 67 IN | RESPIRATION RATE: 16 BRPM | TEMPERATURE: 98.1 F | DIASTOLIC BLOOD PRESSURE: 76 MMHG | SYSTOLIC BLOOD PRESSURE: 118 MMHG | HEART RATE: 59 BPM | WEIGHT: 181.5 LBS | OXYGEN SATURATION: 98 %

## 2017-05-17 DIAGNOSIS — R13.10 DYSPHAGIA, UNSPECIFIED TYPE: ICD-10-CM

## 2017-05-17 DIAGNOSIS — E66.01 MORBID OBESITY DUE TO EXCESS CALORIES (HCC): Primary | ICD-10-CM

## 2017-05-17 DIAGNOSIS — K21.00 REFLUX ESOPHAGITIS: ICD-10-CM

## 2017-05-17 DIAGNOSIS — Z98.84 LAP-BAND SURGERY STATUS: ICD-10-CM

## 2017-05-17 DIAGNOSIS — R11.10 VOMITING, INTRACTABILITY OF VOMITING NOT SPECIFIED, PRESENCE OF NAUSEA NOT SPECIFIED, UNSPECIFIED VOMITING TYPE: ICD-10-CM

## 2017-05-17 RX ORDER — LIDOCAINE HYDROCHLORIDE 20 MG/ML
JELLY TOPICAL AS NEEDED
Status: DISCONTINUED | OUTPATIENT
Start: 2017-05-17 | End: 2017-05-21 | Stop reason: HOSPADM

## 2017-05-17 RX ADMIN — LIDOCAINE HYDROCHLORIDE: 20 JELLY TOPICAL at 08:25

## 2017-05-17 NOTE — MR AVS SNAPSHOT
Visit Information Date & Time Provider Department Dept. Phone Encounter #  
 5/17/2017  4:00 PM Ny Lima NP [facility] [place] [address] [address] [phone] [id number] Follow-up Instructions Return in about 1 day (around 5/18/2017). Your Appointments 8/11/2017  8:20 AM  
ESTABLISHED PATIENT with Jarett Cornelius MD  
[facility] ([address]) Appt Note: 6 Month Follow Up  
 [address] [place] [address]  
[address] [facility] [address] [address]  
  
    
 4/27/2018  9:00 AM  
PACEMAKER with Varsha  [facility] ([place] [facility]) Appt Note: bio threshold/rc/Dunn 1 yr b HM  
 [address] [place]  
[address] [facility]  
  
    
 4/27/2018  9:20 AM  
ESTABLISHED PATIENT with Mode Dunn MD  
[facility] ([address]) Appt Note: bio threshold/rc/Dunn 1 yr b HM  
 [address] [facility] [address]  
[address] [place] [address] [address] [address]  
  
    
  
 7/11/2017 10:00 AM  
REMOTE OFFICE VISIT with Berenice Iyer [facility] ([place] SCHED[facility]) Appt Note: HM PPI/rc b 4-5-17  
 [address] [place]  
[address] [place] [id number]  
  
    
 10/16/2017  9:00 AM  
REMOTE OFFICE VISIT with Berenice Simscher [facility] ([place] SCHEDUL[facility]) Appt Note: HM PPI/rc b  
 [address] [place]  
[phone]  
  
    
 1/22/2018  8:15 AM  
REMOTE OFFICE VISIT with Berenice Iyer [facility] ([place] SCHEDULING) Appt Note: HM PPI/rc b  
 [address] [place]  
[phone] Upcoming Health Maintenance Date Due Hepatitis C Screening 1955 Pneumococcal 19-64 Highest Risk (1 of 3 - PCV13) 7/25/1974 DTaP/Tdap/Td series (1 - Tdap) 7/25/1976 FOBT Q 1 YEAR AGE 50-75 7/25/2005 ZOSTER VACCINE AGE 60> 7/25/2015 INFLUENZA AGE 9 TO ADULT 8/1/2017 Allergies as of 5/17/2017  Review Complete On: 5/17/2017 By: Geryl Bumpers, LPN No Known Allergies Current Immunizations  Never Reviewed No immunizations on file. Not reviewed this visit You Were Diagnosed With   
  
 Codes Comments Morbid obesity due to excess calories ([facility] [address]    -  Primary ICD-10-CM: E66.01 
ICD-9-CM: 278.01   
 LAP-BAND surgery status     ICD-10-CM: Z98.84 ICD-9-CM: V45.86 Vomiting, intractability of vomiting not specified, presence of nausea not specified, unspecified vomiting type     ICD-10-CM: R11.10 ICD-9-CM: 787.03 Dysphagia, unspecified type     ICD-10-CM: R13.10 ICD-9-CM: 787.20 Reflux esophagitis     ICD-10-CM: K21.0 ICD-9-CM: 530.11 BMI 28.0-28.9,adult     ICD-10-CM: W52.47 
ICD-9-CM: V85.24 Vitals BP Pulse Temp Resp Height(growth percentile) Weight(growth percentile) 118/76 (BP 1 Location: Left arm, BP Patient Position: Sitting) (!) 59 98.1 °F (36.7 °C) (Oral) 16 5' 7\" (1.702 m) 181 lb 8 oz (82.3 kg) SpO2 BMI Smoking Status 98% 28.43 kg/m2 Never Smoker BMI and BSA Data Body Mass Index Body Surface Area  
 28.43 kg/m 2 1.97 m 2 Preferred Pharmacy Pharmacy Name Phone [id number]- [place], [place] - [address] [phone] Your Updated Medication List  
  
   
This list is accurate as of: 5/17/17 11:59 PM.  Always use your most recent med list.  
  
  
  
  
 acetaminophen 325 mg tablet Commonly known as:  TYLENOL Take 2 Tabs by mouth every six (6) hours. Indications: PAIN  
  
 amLODIPine 5 mg tablet Commonly known as:  Kerry Vanessa TAKE 1 TABLET EVERY DAY  
  
 aspirin delayed-release 81 mg tablet Take 81 mg by mouth daily. cholecalciferol (vitamin D3) 2,000 unit Tab Take  by mouth daily. FOLBEE PLUS Tab tablet Generic drug:  b complex-vitamin c-folic acid 5mg Take 1 Tab by mouth daily. metFORMIN 500 mg tablet Commonly known as:  GLUCOPHAGE  
250 mg two (2) times a day. mycophenolate mofetil 250 mg capsule Commonly known as:  CELLCEPT Take 250 mg by mouth daily. One Touch Delica 33 gauge Misc Generic drug:  lancets ONETOUCH ULTRA TEST strip Generic drug:  glucose blood VI test strips  
  
 predniSONE 5 mg tablet Commonly known as:  DELTASONE  
5 mg daily. SENSIPAR 30 mg tablet Generic drug:  cinacalcet Take 30 mg by mouth daily. tacrolimus 1 mg capsule Commonly known as:  PROGRAF Take 2 mg by mouth every twelve (12) hours. Anti rejection drug We Performed the Following ADJUSTMENT GASTRIC BAND [ [facility]] Follow-up Instructions Return in about 1 day (around 5/18/2017). To-Do List   
 05/22/2017 10:00 AM  
  Appointment with Bebo Negro MD at [address] [place] [address]. ([phone]) [facility]! [facility] introduces [employer] patient portal. Now you can access parts of your medical record, email your doctor's office, and request medication refills online. 1. In your internet browser, go to [email or web address] 2. Click on the First Time User? Click Here link in the Sign In box. You will see the New Member Sign Up page. 3. Enter your [employer] Access Code exactly as it appears below. You will not need to use this code after youve completed the sign-up process. If you do not sign up before the expiration date, you must request a new code. · [employer] Access Code: [email or web address] Expires: 7/4/2017 10:26 AM 
 
4.  Enter the last four digits of your Social Security Number (xxxx) and Date of Birth (mm/dd/yyyy) as indicated and click Submit. You will be taken to the next sign-up page. 5. Create a [employer] ID. This will be your [employer] login ID and cannot be changed, so think of one that is secure and easy to remember. 6. Create a [employer] password. You can change your password at any time. 7. Enter your Password Reset Question and Answer. This can be used at a later time if you forget your password. 8. Enter your e-mail address. You will receive e-mail notification when new information is available in [address] 9. Click Sign Up. You can now view and download portions of your medical record. 10. Click the Download Summary menu link to download a portable copy of your medical information. If you have questions, please visit the Frequently Asked Questions section of the [employer] website. Remember, [employer] is NOT to be used for urgent needs. For medical emergencies, dial 911. Now available from your iPhone and Android! Please provide this summary of care documentation to your next provider. Your primary care clinician is listed as [address]. If you have any questions after today's visit, please call [phone].

## 2017-05-17 NOTE — PROGRESS NOTES
1. Have you been to the ER, urgent care clinic since your last visit? Hospitalized since your last visit? No    2. Have you seen or consulted any other health care providers outside of the [address] [place] [address] since your last visit? Include any pap smears or colon screening.  No

## 2017-05-17 NOTE — PROGRESS NOTES
Wound Care Center  Progress Note      DOS: 5/17/2017       Subjective:       Chief Complaint:  Christopher Parisi is a 64 y.o.  diabetic male, s/p renal transplant who presents with a penile wound of about 6 weeks duration, after implantation of a penile implant.      Referred by: Dr. Alexandr Rodgers      HPI:   Wound caused by: non-healed surgical wound for implant. Offloading wound: yes and n/a  Appetite: good  Wound associated pain: mild  Diabetic: Yes  Smoker: No  ROS: no N/V, no T/chills; no local rash      Physical Exam:       VSS, Afebrile   General: well developed, well nourished, pleasant , NAD. Hygiene good  Psych: cooperative. Pleasant. No anxiety or depression. Normal mood and affect. Neuro: alert and oriented to person/place/situation. Otherwise nonfocal.  HEENT: Normocephalic, atraumatic. EOMI. Conjunctiva clear. No scleral icterus. Chest: Respirations nonlabored. Abdomen: nondistended.      Penis Ulcer Description:   Etiology: combined  Measurement: 2.0 x 3.3 x 0.1 cm  Ulcer bed: Slough  Periwound: Normal  Exudate: Scant/small amount Serous exudate  Depth of Wound: To Fat Layer           Assessment:       64 y.o. 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 Diabetic, renal transplant, with post-surgical wound.      Plan:    Ulcer Debridement      Ulcer Number 1; Penile woundTo Fat Layer      Character of Ulcer: Smaller       Indication for Debridement: Slough      Risks of procedure were discussed with patient.  Consent has been signed.   Tona Denver  Anesthetic: Lidocaine 2% topical gel       Level of Debridement: Subcutaneous       Tissue and/or Material removed: Slough      Type of Tissue: Non- Viable       Pre-debridement severity: Fat Layer Exposed       Post debridement severity: Fat Layer exposed      Instrument(s) used: 5 mm ring curette      Bleeding controlled with: Pressure      Estimated blood loss: Minimal      Post procedural pain: Minimal      Patient tolerated procedure well.       Dressing: Santyl, foam.   Frequency: daily      Patient understood and agrees with plan. Questions answered. We are going to check on [employer] availability. Patient is very anxious to get this wound healed as soon as possible. Weekly visits and serial debridements also discussed.   Follow up with me in 1 week      Signed By: Vee Richmond MD

## 2017-05-18 NOTE — PROGRESS NOTES
Remedios Hernandez is a 64 y.o. male 9 years post lap gastric banding, 100.5 pounds. Weight today is 181.5 pounds. Patient comes in office today with one month history of mild dysphagia with coarse/textured foods and reflux. Patient asking to have some fluid removed from lap band. No nausea. Stated has had a couple of episodes of vomiting, no heartburn, no night-time cough, no fever, no chills, No shortness of breath, no chest pain, no abdominal pain. Stated having hard time with meals. Stated has portion control. Denies eating/drinking together. Drinking at least 40 ounces of water daily. Exercising with walking. No issues with urination or bowel habits. HPI    Review of Systems   Constitutional: Negative for chills, fever and malaise/fatigue. Respiratory: Negative for cough, sputum production and shortness of breath. Cardiovascular: Negative for chest pain, palpitations and leg swelling. Gastrointestinal: Positive for vomiting. Negative for abdominal pain, blood in stool, constipation, diarrhea, heartburn and nausea. Genitourinary: Negative for dysuria. Neurological: Negative for dizziness. Physical Exam   Constitutional: He is oriented to person, place, and time. He appears well-developed and well-nourished. Abdominal: Soft. Bowel sounds are normal. He exhibits no distension. There is no tenderness. There is no rebound and no guarding. Lap sites healed. Port is palpable. Musculoskeletal: Normal range of motion. He exhibits no edema. Neurological: He is alert and oriented to person, place, and time. Skin: Skin is warm and dry. No rash noted. No erythema. Psychiatric: He has a normal mood and affect. His behavior is normal. Thought content normal.   Blood pressure 118/76, pulse (!) 59, temperature 98.1 °F (36.7 °C), temperature source Oral, resp. rate 16, height 5' 7\" (1.702 m), weight 181 lb 8 oz (82.3 kg), SpO2 98 %.         ASSESSMENT and PLAN  Morbid Obesity  9 years post lap gastric banding, 100.5 pounds. Weight today is 181.5 pounds. Dysphagia, reflux, vomiting. Informed consent was obtained. Patient was placed in the standing/supine position. The abdomen was prepped using sterile technique. The port was then accessed with ease. .25 ml was removed from the band. Patient has 6.5 of saline in band. Patient tolerated the procedure well and without difficulty. Patient then tolerated  At least 8 ounces of water without difficulty. Patient was instructed in full liquid diet for the next 12-24 hours then advance as tolerated. Patient was advised to notify office if experience dysphagia, nausea/vomiting, reflux despite removal of some fluid from band. Advised if symptoms continue or become worse, will need x-ray evaluation of lap band. Patient verbalized understanding and questions were answered to the best of my knowledge and ability. Advised if nay questions or concerns to call the office.

## 2017-05-24 ENCOUNTER — HOSPITAL ENCOUNTER (OUTPATIENT)
Dept: WOUND CARE | Age: 62
Discharge: HOME | End: 2017-05-24
Payer: MEDICARE

## 2017-05-24 RX ORDER — LIDOCAINE HYDROCHLORIDE 20 MG/ML
JELLY TOPICAL ONCE
Status: COMPLETED | OUTPATIENT
Start: 2017-05-24 | End: 2017-05-24

## 2017-05-24 RX ADMIN — LIDOCAINE HYDROCHLORIDE: 20 JELLY TOPICAL at 09:22

## 2017-05-24 NOTE — PROGRESS NOTES
Wound Care Center  Progress Note      DOS: 5/24/2017       Subjective:       Chief Complaint:  Robbie Espinal is a 64 y.o.  diabetic male, s/p renal transplant who presents with a penile wound of about 7 weeks duration, after implantation of a penile implant.      Referred by: Dr. Cole Flores      HPI:   Wound caused by: non-healed surgical wound for implant. Offloading wound: yes and n/a  Appetite: good  Wound associated pain: mild  Diabetic: Yes  Smoker: No  ROS: no N/V, no T/chills; no local rash      Physical Exam:       VSS, Afebrile   General: well developed, well nourished, pleasant , NAD. Hygiene good  Psych: cooperative. Pleasant. No anxiety or depression. Normal mood and affect. Neuro: alert and oriented to person/place/situation. Otherwise nonfocal.  HEENT: Normocephalic, atraumatic. EOMI. Conjunctiva clear. No scleral icterus. Chest: Respirations nonlabored. Abdomen: nondistended.      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 Diabetic, renal transplant, with post-surgical wound.      Plan:    Ulcer Debridement      Ulcer Number 1; Penile woundTo Fat Layer      Character of Ulcer: Smaller       Indication for Debridement: Slough      Risks of procedure were discussed with patient.  Consent has been signed.   Meeta Marlow  Anesthetic: Lidocaine 2% topical gel       Level of Debridement: Subcutaneous       Tissue and/or Material removed: Slough      Type of Tissue: Non- Viable       Pre-debridement severity: Fat Layer Exposed       Post debridement severity: Fat Layer exposed      Instrument(s) used: 5 mm ring curette      Bleeding controlled with: Pressure      Estimated blood loss: Minimal      Post procedural pain: Minimal      Patient tolerated procedure well.       Dressing: Santyl, foam.   Frequency: daily      Patient understood and agrees with plan. Questions answered. Weekly visits and serial debridements also discussed.   Follow up with me in 1 week      Signed By: Avni Hendricks MD

## 2017-05-31 ENCOUNTER — HOSPITAL ENCOUNTER (OUTPATIENT)
Dept: WOUND CARE | Age: 62
Discharge: HOME | End: 2017-05-31
Payer: MEDICARE

## 2017-05-31 PROCEDURE — 97597 DBRDMT OPN WND 1ST 20 CM/<: CPT | Performed by: ORTHOPAEDIC SURGERY

## 2017-05-31 RX ORDER — LIDOCAINE HYDROCHLORIDE 20 MG/ML
JELLY TOPICAL AS NEEDED
Status: DISCONTINUED | OUTPATIENT
Start: 2017-05-31 | End: 2017-06-04 | Stop reason: HOSPADM

## 2017-05-31 RX ADMIN — LIDOCAINE HYDROCHLORIDE: 20 JELLY TOPICAL at 11:02

## 2017-05-31 NOTE — PROGRESS NOTES
Wound Care Center  Progress Note      DOS: 5/31/2017       Subjective:       Chief Complaint:  Remedios Hernandez is a 64 y.o.  diabetic male, s/p renal transplant who presents with a penile wound of about 8 weeks duration, after implantation of a penile implant.      Referred by: Dr. Devante Perez      HPI:   Wound caused by: non-healed surgical wound for implant. Offloading wound: yes and n/a  Appetite: good  Wound associated pain: mild  Diabetic: Yes  Smoker: No  ROS: no N/V, no T/chills; no local rash      Physical Exam:       VSS, Afebrile   General: well developed, well nourished, pleasant , NAD. Hygiene good  Psych: cooperative. Pleasant. No anxiety or depression. Normal mood and affect. Neuro: alert and oriented to person/place/situation. Otherwise nonfocal.  HEENT: Normocephalic, atraumatic. EOMI. Conjunctiva clear. No scleral icterus. Chest: Respirations nonlabored. Abdomen: nondistended.      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 Diabetic, renal transplant, with post-surgical wound.      Plan:    Ulcer Debridement      Ulcer Number 1; Penile woundTo Fat Layer      Character of Ulcer: Smaller       Indication for Debridement: Slough      Risks of procedure were discussed with patient.  Consent has been signed.   Sharon Hinojosa  Anesthetic: Lidocaine 2% topical gel       Level of Debridement: Subcutaneous       Tissue and/or Material removed: Slough      Type of Tissue: Non- Viable       Pre-debridement severity: Fat Layer Exposed       Post debridement severity: Fat Layer exposed      Instrument(s) used: 5 mm ring curette      Bleeding controlled with: Pressure      Estimated blood loss: Minimal      Post procedural pain: Minimal      Patient tolerated procedure well.       Dressing: Santyl, foam.   Frequency: daily      Patient understood and agrees with plan. Questions answered. Weekly visits and serial debridements also discussed.   Follow up with me in 1 week      Signed By: Kristi Rizo MD

## 2017-06-12 ENCOUNTER — HOSPITAL ENCOUNTER (OUTPATIENT)
Dept: WOUND CARE | Age: 62
Discharge: HOME | End: 2017-06-12
Payer: MEDICARE

## 2017-06-12 PROCEDURE — 97597 DBRDMT OPN WND 1ST 20 CM/<: CPT | Performed by: ORTHOPAEDIC SURGERY

## 2017-06-12 RX ORDER — LIDOCAINE HYDROCHLORIDE 40 MG/ML
SOLUTION TOPICAL AS NEEDED
Status: DISCONTINUED | OUTPATIENT
Start: 2017-06-12 | End: 2017-06-16 | Stop reason: HOSPADM

## 2017-06-12 RX ADMIN — LIDOCAINE HYDROCHLORIDE: 40 SOLUTION TOPICAL at 08:19

## 2017-06-12 NOTE — PROGRESS NOTES
Wound Care Center  Progress Note      DOS: 6/12/2017       Subjective:       Chief Complaint:  Brittany Marroquin is a 64 y.o.  diabetic male, s/p renal transplant who presents with a penile wound of about 8 weeks duration, after implantation of a penile implant.      Referred by: Dr. Juan Luis Valladares      HPI:   Wound caused by: non-healed surgical wound for implant. Offloading wound: yes and n/a  Appetite: good  Wound associated pain: mild  Diabetic: Yes  Smoker: No  ROS: no N/V, no T/chills; no local rash      Physical Exam:       VSS, Afebrile   General: well developed, well nourished, pleasant , NAD. Hygiene good  Psych: cooperative. Pleasant. No anxiety or depression. Normal mood and affect. Neuro: alert and oriented to person/place/situation. Otherwise nonfocal.  HEENT: Normocephalic, atraumatic. EOMI. Conjunctiva clear. No scleral icterus. Chest: Respirations nonlabored. Abdomen: nondistended.      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 Diabetic, renal transplant, with post-surgical wound.      Plan:    Ulcer Debridement      Ulcer Number 1; Penile woundTo Fat Layer      Character of Ulcer: Smaller       Indication for Debridement: Slough      Risks of procedure were discussed with patient.  Consent has been signed.   Larry Cruz  Anesthetic: Lidocaine 2% topical gel       Level of Debridement: Subcutaneous       Tissue and/or Material removed: Slough      Type of Tissue: Non- Viable       Pre-debridement severity: Fat Layer Exposed       Post debridement severity: Fat Layer exposed      Instrument(s) used: 5 mm ring curette      Bleeding controlled with: Pressure      Estimated blood loss: Minimal      Post procedural pain: Minimal      Patient tolerated procedure well.       Dressing: Santyl, foam.   Frequency: daily      Patient understood and agrees with plan. Questions answered. Weekly visits and serial debridements also discussed.   Follow up with me in 1 week      Signed By: MD Claudia Chowdhury

## 2017-06-19 ENCOUNTER — HOSPITAL ENCOUNTER (OUTPATIENT)
Dept: WOUND CARE | Age: 62
Discharge: HOME | End: 2017-06-19
Payer: MEDICARE

## 2017-06-19 PROCEDURE — 99212 OFFICE O/P EST SF 10 MIN: CPT | Performed by: ORTHOPAEDIC SURGERY

## 2017-06-19 NOTE — PROGRESS NOTES
Wound Care Center  Progress Note      DOS: 6/19/2017       Subjective:       Chief Complaint:  Mickie Holden is a 64 y.o.  diabetic male, s/p renal transplant who presents with a penile wound of about 8 weeks duration, after implantation of a penile implant.      Referred by: Dr. Mario Murcia      HPI:   Wound caused by: non-healed surgical wound for implant. Offloading wound: yes and n/a  Appetite: good  Wound associated pain: mild  Diabetic: Yes  Smoker: No  ROS: no N/V, no T/chills; no local rash      Physical Exam:       VSS, Afebrile   General: well developed, well nourished, pleasant , NAD. Hygiene good  Psych: cooperative. Pleasant. No anxiety or depression. Normal mood and affect. Neuro: alert and oriented to person/place/situation. Otherwise nonfocal.  HEENT: Normocephalic, atraumatic. EOMI. Conjunctiva clear. No scleral icterus. Chest: Respirations nonlabored. Abdomen: nondistended.      Penis Ulcer Description:   Etiology: combined  Ulcer is healed.        Assessment:       64 y.o. male with healed ulcer. Diabetic, renal transplant, with post-surgical wound.      Plan:   Moisturize, no sex for 2 weeks to let skin toughen up a bit. Will follow up as needed.     Mai Gauthier MD

## 2017-07-11 ENCOUNTER — OFFICE VISIT (OUTPATIENT)
Dept: CARDIOLOGY CLINIC | Age: 62
End: 2017-07-11

## 2017-07-11 DIAGNOSIS — Z95.0 CARDIAC PACEMAKER IN SITU: Primary | ICD-10-CM

## 2017-10-16 ENCOUNTER — OFFICE VISIT (OUTPATIENT)
Dept: CARDIOLOGY CLINIC | Age: 62
End: 2017-10-16

## 2017-10-16 DIAGNOSIS — Z95.0 CARDIAC PACEMAKER IN SITU: Primary | ICD-10-CM

## 2017-11-08 ENCOUNTER — OFFICE VISIT (OUTPATIENT)
Dept: SURGERY | Age: 62
End: 2017-11-08

## 2017-11-08 VITALS
HEIGHT: 67 IN | DIASTOLIC BLOOD PRESSURE: 63 MMHG | RESPIRATION RATE: 20 BRPM | WEIGHT: 192 LBS | SYSTOLIC BLOOD PRESSURE: 132 MMHG | BODY MASS INDEX: 30.13 KG/M2 | HEART RATE: 62 BPM | TEMPERATURE: 97.7 F | OXYGEN SATURATION: 96 %

## 2017-11-08 DIAGNOSIS — Z98.84 LAP-BAND SURGERY STATUS: ICD-10-CM

## 2017-11-08 DIAGNOSIS — R63.5 WEIGHT GAIN: ICD-10-CM

## 2017-11-08 DIAGNOSIS — E66.01 MORBID OBESITY (HCC): Primary | ICD-10-CM

## 2017-11-08 DIAGNOSIS — Z46.51 ENCOUNTER FOR ADJUSTMENT OF GASTRIC LAP BAND: ICD-10-CM

## 2017-11-08 NOTE — PROGRESS NOTES
Karolina Stanford is a 58 y.o. male 9 years s/p lap gastric band, down 90 pounds. Weight today is 192 pounds. Patient has gained 10.5 pounds since last seen in May 2017. Patient stated doing well, wants adjustment of lap band. Stated portions are getting larger and snacking more. No nausea/vomiting, no heartburn/reflux, no night-time cough, no fever, no chills, No shortness of breath, no chest pain, no abdominal pain. Denies dysphagia. No measuring of meals. Snacking with chips and popcorn. Drinking at least 40 ounces of water daily. Admits to drinking a can of soda daily. Exercising very little. HPI    ROS    Physical Exam    ASSESSMENT and PLAN  Morbid Obesity  9 years s/p lap gastric band, down 90 pounds. Weight today is 192 pounds. With verbal consent an office adjustment was performed today. Patient was placed in standing position. Abdomen was prepped using sterile technique, Suh needle easily accessed band. A total of 6.5 ml of normal saline is suppose to be in lap band, an additional .75 ml was added to band. Patient has 7.25 ml of normal saline in band. Patient then tolerated 6-8 oz fluid bolus with productive belch. Advised to stay on full diet for 12-24 hours, then advance diet as tolerated. If developed nausea, vomiting, dysphagia, heartburn, reflux, and/or nighttime cough advised to call office for possible removal of some fluid out of band. Yet if tolerating diet patient to follow up in office in 4 to 6 weeks. Advised patient to continue with diet that is high-protein, low-fat, low-sugar, and measuring 4 ounces at each meal. Also continue hydrating with at least  40 ounces of no-calorie, non-carbonated beverages. Discussed snacking choice, focus on protein and limiting carbohydrate. Advised to call office if any questions/concerns.

## 2017-11-08 NOTE — MR AVS SNAPSHOT
Visit Information Date & Time Provider Department Dept. Phone Encounter #  
 11/8/2017  1:40 PM Lara Covington NP [place] [address] [phone] [id number] Follow-up Instructions Return in about 6 weeks (around 12/20/2017). Your Appointments 4/27/2018  9:00 AM  
PACEMAKER with GUILLE3ELSY [facility] ([place] SCHEDULING) Appt Note: bio threshold/rc/Dunn 1 yr b HM  
 [address] [place] [address]  
[address] [facility] [place]  
  
    
 4/27/2018  9:20 AM  
ESTABLISHED PATIENT with Jose Benitez MD  
[facility] ([facility]) Appt Note: bio threshold/rc/Dunn 1 yr b HM  
 [address] [id number]  
[address] [place] [address]  
  
    
  
 1/22/2018  8:15 AM  
REMOTE OFFICE VISIT with Funmi Walters [facility] ([place] SCHEDULING) Appt Note: HM PPI/rc b  
 [address] [place] [address]  
[address] [address] Upcoming Health Maintenance Date Due Hepatitis C Screening 1955 Pneumococcal 19-64 Highest Risk (1 of 3 - PCV13) 7/25/1974 DTaP/Tdap/Td series (1 - Tdap) 7/25/1976 FOBT Q 1 YEAR AGE 50-75 7/25/2005 ZOSTER VACCINE AGE 60> 5/25/2015 Influenza Age 5 to Adult 8/1/2017 Allergies as of 11/8/2017  Review Complete On: 11/8/2017 By: Lara Covington NP No Known Allergies Current Immunizations  Never Reviewed No immunizations on file. Not reviewed this visit You Were Diagnosed With   
  
 Codes Comments Morbid obesity ([facility] [address]    -  Primary ICD-10-CM: E66.01 
ICD-9-CM: 278.01   
 LAP-BAND surgery status     ICD-10-CM: Z98.84 ICD-9-CM: V45.86  Encounter for adjustment of gastric lap band     ICD-10-CM: Z46.51 
 ICD-9-CM: V53.51 Weight gain     ICD-10-CM: R63.5 ICD-9-CM: 783.1 BMI 30.0-30.9,adult     ICD-10-CM: Z68.30 ICD-9-CM: V85.30 Vitals BP Pulse Temp Resp Height(growth percentile) Weight(growth percentile) 132/63 (BP 1 Location: Left arm, BP Patient Position: Sitting) 62 97.7 °F (36.5 °C) (Oral) 20 5' 7\" (1.702 m) 192 lb (87.1 kg) SpO2 BMI Smoking Status 96% 30.07 kg/m2 Never Smoker BMI and BSA Data Body Mass Index Body Surface Area 30.07 kg/m 2 2.03 m 2 Preferred Pharmacy Pharmacy Name Phone [facility]/PHARMACY #[id number] [place], [place] - [address] [phone] Your Updated Medication List  
  
   
This list is accurate as of: 11/8/17  3:12 PM.  Always use your most recent med list.  
  
  
  
  
 acetaminophen 325 mg tablet Commonly known as:  TYLENOL Take 2 Tabs by mouth every six (6) hours. Indications: PAIN  
  
 amLODIPine 5 mg tablet Commonly known as:  Dash Alstrom TAKE 1 TABLET EVERY DAY  
  
 aspirin delayed-release 81 mg tablet Take 81 mg by mouth daily. cholecalciferol (vitamin D3) 2,000 unit Tab Take  by mouth daily. FOLBEE PLUS Tab tablet Generic drug:  b complex-vitamin c-folic acid 5mg Take 1 Tab by mouth daily. metFORMIN 500 mg tablet Commonly known as:  GLUCOPHAGE  
250 mg two (2) times a day. mycophenolate mofetil 250 mg capsule Commonly known as:  CELLCEPT Take 250 mg by mouth daily. One Touch Delica 33 gauge Misc Generic drug:  lancets ONETOUCH ULTRA TEST strip Generic drug:  glucose blood VI test strips  
  
 predniSONE 5 mg tablet Commonly known as:  DELTASONE  
5 mg daily. SENSIPAR 30 mg tablet Generic drug:  cinacalcet Take 30 mg by mouth daily. tacrolimus 1 mg capsule Commonly known as:  PROGRAF Take 2 mg by mouth every twelve (12) hours. Anti rejection drug We Performed the Following ADJUSTMENT GASTRIC BAND [ \[facility]\""] Follow-up Instructions Return in about 6 weeks (around 12/20/2017). Introducing [facility]! Monalisa Koo introduces [email or web address] patient portal. Now you can access parts of your medical record, email your doctor's office, and request medication refills online. 1. In your internet browser, go to [email or web address] 2. Click on the First Time User? Click Here link in the Sign In box. You will see the New Member Sign Up page. 3. Enter your [employer] Access Code exactly as it appears below. You will not need to use this code after youve completed the sign-up process. If you do not sign up before the expiration date, you must request a new code. · [email or web address] Access Code: [email or web address] Expires: 1/14/2018  2:24 PM 
 
4. Enter the last four digits of your Social Security Number (xxxx) and Date of Birth (mm/dd/yyyy) as indicated and click Submit. You will be taken to the next sign-up page. 5. Create a [email or web address] ID. This will be your [email or web address] login ID and cannot be changed, so think of one that is secure and easy to remember. 6. Create a [email or web address] password. You can change your password at any time. 7. Enter your Password Reset Question and Answer. This can be used at a later time if you forget your password. 8. Enter your e-mail address. You will receive e-mail notification when new information is available in [address] 9. Click Sign Up. You can now view and download portions of your medical record. 10. Click the Download Summary menu link to download a portable copy of your medical information. If you have questions, please visit the Frequently Asked Questions section of the [email or web address] website. Remember, [email or web address] is NOT to be used for urgent needs. For medical emergencies, dial 911. Now available from your iPhone and Android! Please provide this summary of care documentation to your next provider. Your primary care clinician is listed as [address]. If you have any questions after today's visit, please call [phone].

## 2017-11-08 NOTE — PROGRESS NOTES
1. Have you been to the ER, urgent care clinic since your last visit? Hospitalized since your last visit? No    2. Have you seen or consulted any other health care providers outside of the [address] [place] [address] since your last visit? Include any pap smears or colon screening.  No

## 2018-01-22 ENCOUNTER — OFFICE VISIT (OUTPATIENT)
Dept: CARDIOLOGY CLINIC | Age: 63
End: 2018-01-22

## 2018-01-22 DIAGNOSIS — Z95.0 CARDIAC PACEMAKER IN SITU: Primary | ICD-10-CM

## 2018-05-15 ENCOUNTER — OFFICE VISIT (OUTPATIENT)
Dept: CARDIOLOGY CLINIC | Age: 63
End: 2018-05-15

## 2018-05-15 ENCOUNTER — CLINICAL SUPPORT (OUTPATIENT)
Dept: CARDIOLOGY CLINIC | Age: 63
End: 2018-05-15

## 2018-05-15 VITALS
HEIGHT: 67 IN | SYSTOLIC BLOOD PRESSURE: 140 MMHG | HEART RATE: 67 BPM | BODY MASS INDEX: 29.03 KG/M2 | DIASTOLIC BLOOD PRESSURE: 68 MMHG | WEIGHT: 185 LBS

## 2018-05-15 DIAGNOSIS — I51.7 LVH (LEFT VENTRICULAR HYPERTROPHY): ICD-10-CM

## 2018-05-15 DIAGNOSIS — Z94.0 RENAL TRANSPLANT RECIPIENT: ICD-10-CM

## 2018-05-15 DIAGNOSIS — Z95.0 CARDIAC PACEMAKER IN SITU: Primary | ICD-10-CM

## 2018-05-15 DIAGNOSIS — I45.2 RBBB (RIGHT BUNDLE BRANCH BLOCK WITH LEFT ANTERIOR FASCICULAR BLOCK): ICD-10-CM

## 2018-05-15 DIAGNOSIS — I44.1 SECOND DEGREE AV BLOCK, MOBITZ TYPE II: ICD-10-CM

## 2018-05-15 RX ORDER — BISMUTH SUBSALICYLATE 262 MG
1 TABLET,CHEWABLE ORAL DAILY
COMMUNITY

## 2018-05-15 RX ORDER — TAMSULOSIN HYDROCHLORIDE 0.4 MG/1
0.4 CAPSULE ORAL DAILY
COMMUNITY

## 2018-05-15 NOTE — PROGRESS NOTES
Verified patient with two types of identifiers. Verified medications with patients medications list.  Verified pharmacy with patient.

## 2018-05-15 NOTE — PROGRESS NOTES
Cardiac Electrophysiology Office Note     Subjective: Roseanna Dowling is a 58 y.o. man who is here today for follow up annualy because Biotronik pacer   It is known no capture of atrial lead as xray had shown A lead dislodged in 2015 and was hanging in RA for sensing  He is s/p total knee replacement with Dr Brea Mcmahon   He had atrial lead dislodged on xray in Nov 2015 but does not need reposition after VDD mode allowed him to do well  He is doing okay after his knee replacement  No dizziness, syncope   2014 echo at [address] [place] [address] with severe LVH and dilated RV    S/p dual chamber pacemaker (Biotronik [place]) 10/29/15. He has a history of kidney transplant. He has a right arm fistula. He lost about 157 pounds with diet. He has severe left knee arthritis and wanted to have surgery done. He had shortness of breath on exertion, tired easily. In the past when he went for surgery, it was postponed because of bradycardia. EKG show that he has first degree AV block and bifascicular block. He had a Holter monitor that showed second degree AV block with severe bradycardia. 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       multivitamin (ONE A DAY) tablet Take 1 Tab by mouth daily.  aspirin delayed-release 81 mg tablet Take 81 mg by mouth daily.  amLODIPine (NORVASC) 5 mg tablet TAKE 1 TABLET EVERY DAY 30 Tab 5    mycophenolate (CELLCEPT) 250 mg capsule Take 250 mg by mouth daily.  metFORMIN (GLUCOPHAGE) 500 mg tablet 500 mg two (2) times a day.  cholecalciferol, vitamin D3, 2,000 unit tab Take  by mouth daily.  predniSONE (DELTASONE) 5 mg tablet 5 mg daily.  tacrolimus (PROGRAF) 1 mg capsule Take 2 mg by mouth every twelve (12) hours. Anti rejection drug      SENSIPAR 30 mg tablet Take 30 mg by mouth daily. 3    acetaminophen (TYLENOL) 325 mg tablet Take 2 Tabs by mouth every six (6) hours. Indications: PAIN 30 Tab 0    ONETOUCH ULTRA TEST strip       ONE TOUCH DELICA 33 gauge misc   1    b complex-vitamin c-folic acid 5mg (FOLBEE PLUS) Tab tablet Take 1 Tab by mouth daily. No Known Allergies  Past Medical History:   Diagnosis Date    A-V fistula ([facility] [address] 2010    Right    Cancer [facility])     prostate - radiation    Chronic kidney disease     Chronic pain     Diabetes ([facility] [address]     h/o Gastric band     h/o successful Renal transplant 2014    Hypertension     Pacemaker 10/2015     Past Surgical History:   Procedure Laterality Date    COLONOSCOPY N/A 4/25/2017    COLONOSCOPY performed by Lisseth Serrano MD at [address] HX PACEMAKER  10/29/15    not a defrillator    HX RENAL TRANSPLANT  2-16-14    MCV    HX RENAL TRANSPLANT Right 2/2014    INS PPM/ICD LED DUAL ONLY  10/29/2015         LAP, PLACE ADJUST GASTR BAND      PENILE PUMP      twice - last 2/2017     No family history of pacemaker   Social History   Substance Use Topics    Smoking status: Never Smoker    Smokeless tobacco: Never Used    Alcohol use No        Review of Systems:   Constitutional: Negative for fever, chills, weight loss   HEENT: Negative for nosebleeds, vision changes.    Respiratory: Negative for cough, hemoptysis, sputum production, and wheezing. Cardiovascular: Negative for chest pain, palpitations, orthopnea, claudication, leg swelling, syncope, and PND. Gastrointestinal: Negative for nausea, vomiting, diarrhea, constipation, blood in stool and melena. Genitourinary: Negative for dysuria, and hematuria. Musculoskeletal: Negative for myalgias, arthralgia . Skin: Negative for rash. Heme: Does not bleed or bruise easily. Neurological: Negative for speech change and focal weakness     Objective:     Visit Vitals    /68 (BP 1 Location: Left arm, BP Patient Position: Sitting)    Pulse 67    Ht 5' 7\" (1.702 m)    Wt 185 lb (83.9 kg)    BMI 28.98 kg/m2      Physical Exam:   Constitutional: well-developed and well-nourished. No distress. Head: Normocephalic and atraumatic. Eyes: Pupils are equal, round  Neck: supple. No JVD present. Cardiovascular: Normal rate, regular rhythm and normal heart sounds. Exam reveals no gallop and no friction rub. No murmur heard. Pulmonary/Chest: Effort normal and breath sounds normal. No wheezes. Abdominal: Soft, no tenderness. Musculoskeletal: no edema. right arm old AV fistula  Neurological: alert,oriented. Skin: Skin is warm and dry. Left side pacemaker site healed/ unremarkable,   Psychiatric: normal mood and affect. Behavior is normal. Judgment and thought content normal.          Assessment/Plan:       ICD-10-CM ICD-9-CM    1. Cardiac pacemaker in situ Z95.0 V45.01    2. Second degree AV block, Mobitz type II I44.1 426.12    3. Renal transplant recipient Z94.0 V42.0    4. 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  LVH (left ventricular hypertrophy) I51.7 429.3      reviewed pacemaker check and he is comfortable with keeping the current pacemaker mode and does not need RA lead reposition He needs V pacing 76%  His sinus node allows his rate response   I recommend 2 D echo for LVEF as he is RV pacing > 40% and he had severe LVH and dilated RV on echo 2014  He checks pacer every 3 months remotely and once a year in office    Jesenia Munoz M.D.   Electrophysiology/Cardiology  [facility] [place]  [address], [address] , [address]  [phone]

## 2018-05-15 NOTE — MR AVS SNAPSHOT
[address] [facility] [address] [place] [address] 
[phone] Patient: Colleen Dixon MRN: [id number] Visit Information Date & Time Provider Department Dept. Phone Encounter #  
 5/15/2018  1:40 PM Latricia Patel MD [facility] [phone] [id number] Follow-up Instructions Return in about 1 year (around 5/15/2019). Follow-up and Disposition History Your Appointments 6/6/2019  9:00 AM  
PACEMAKER with PACEMAKER3ELSY [facility] ([place] SCHEDULING) Appt Note: HM threshold/rc/ Dunn annual b  HM  
 [address] [place] [address]  
[phone]  
  
   
 [address] [place]  
  
    
 6/6/2019  9:20 AM  
ESTABLISHED PATIENT with Latricia Patel MD  
[facility] ([facility]) Appt Note: HM threshold/rc/ Dunn annual b  HM  
 [address] [facility] [address]  
[address] [facility] [address]  
  
    
  
 8/20/2018  2:30 PM  
REMOTE OFFICE VISIT with [facility] ([place] SCHEDULING) Appt Note: HM PPI/rc b 5-15-18  
 [address] [facility] [address]  
[address] [facility] [address]  
  
    
 11/26/2018  8:00 AM  
REMOTE OFFICE VISIT with [facility] ([place] SCHEDULING) Appt Note: HM PPI/rc b  
 [address] [place]  
[phone]  
  
    
 2/27/2019  8:15 AM  
REMOTE OFFICE VISIT with [facility] ([place] SCHEDULING) Appt Note: HM PPI/rc b  
 [address] [place] [address]  
[phone] Upcoming Health Maintenance Date Due Hepatitis C Screening 1955 Pneumococcal 19-64 Highest Risk (1 of 3 - PCV13) 7/25/1974 DTaP/Tdap/Td series (1 - Tdap) 7/25/1976 FOBT Q 1 YEAR AGE 50-75 7/25/2005 ZOSTER VACCINE AGE 60> 5/25/2015 MEDICARE YEARLY EXAM 3/14/2018 Influenza Age 5 to Adult 8/1/2018 Allergies as of 5/15/2018  Review Complete On: 5/15/2018 By: Silvia Langley MD  
 No Known Allergies Current Immunizations  Never Reviewed No immunizations on file. Not reviewed this visit You Were Diagnosed With   
  
 Codes Comments Cardiac pacemaker in situ    -  Primary ICD-10-CM: Z95.0 ICD-9-CM: V45.01 Second degree AV block, Mobitz type II     ICD-10-CM: I44.1 ICD-9-CM: 426.12 Renal transplant recipient     ICD-10-CM: Z94.0 ICD-9-CM: V42.0   
 RBBB (right bundle branch block with left anterior fascicular block)     ICD-10-CM: I45.2 ICD-9-CM: 426.52   
 LVH (left ventricular hypertrophy)     ICD-10-CM: I51.7 ICD-9-CM: 429.3 Vitals BP Pulse Height(growth percentile) Weight(growth percentile) BMI Smoking Status 140/68 (BP 1 Location: Left arm, BP Patient Position: Sitting) 67 5' 7\" (1.702 m) 185 lb (83.9 kg) 28.98 kg/m2 Never Smoker Vitals History BMI and BSA Data Body Mass Index Body Surface Area  
 28.98 kg/m 2 1.99 m 2 Preferred Pharmacy Pharmacy Name Phone [facility]/PHARMACY #[id number]- [place], [place] - [address] [phone] Your Updated Medication List  
  
   
This list is accurate as of 5/15/18  2:16 PM.  Always use your most recent med list.  
  
  
  
  
 acetaminophen 325 mg tablet Commonly known as:  TYLENOL Take 2 Tabs by mouth every six (6) hours. Indications: PAIN  
  
 amLODIPine 5 mg tablet Commonly known as:  Ana Seo TAKE 1 TABLET EVERY DAY  
  
 aspirin delayed-release 81 mg tablet Take 81 mg by mouth daily. cholecalciferol (vitamin D3) 2,000 unit Tab Take  by mouth daily. FLOMAX 0.4 mg capsule Generic drug:  tamsulosin Take 0.4 mg by mouth daily. FOLBEE PLUS Tab tablet Generic drug:  b complex-vitamin c-folic acid 5mg Take 1 Tab by mouth daily. metFORMIN 500 mg tablet Commonly known as:  GLUCOPHAGE  
500 mg two (2) times a day. multivitamin tablet Commonly known as:  ONE A DAY Take 1 Tab by mouth daily. mycophenolate mofetil 250 mg capsule Commonly known as:  CELLCEPT Take 250 mg by mouth daily. One Touch Delica 33 gauge Misc Generic drug:  lancets ONETOUCH ULTRA TEST strip Generic drug:  glucose blood VI test strips  
  
 predniSONE 5 mg tablet Commonly known as:  DELTASONE  
5 mg daily. SENSIPAR 30 mg tablet Generic drug:  cinacalcet Take 30 mg by mouth daily. tacrolimus 1 mg capsule Commonly known as:  PROGRAF Take 2 mg by mouth every twelve (12) hours. Anti rejection drug We Performed the Following 2D ECHO COMPLETE ADULT (TTE) W OR WO CONTR [24905 CPT(R)] Follow-up Instructions Return in about 1 year (around 5/15/2019). Patient Instructions You will need to follow up in clinic with Dr. Charmayne Angst in 12 months. You will be scheduled for an Echo Introducing [facility]! [facility] introduces [employer] patient portal. Now you can access parts of your medical record, email your doctor's office, and request medication refills online. 1. In your internet browser, go to [email or web address] 2. Click on the First Time User? Click Here link in the Sign In box. You will see the New Member Sign Up page. 3. Enter your [employer] Access Code exactly as it appears below. You will not need to use this code after youve completed the sign-up process. If you do not sign up before the expiration date, you must request a new code. · [employer] Access Code: [email or web address] Expires: 8/13/2018  2:16 PM 
 
4.  Enter the last four digits of your Social Security Number (xxxx) and Date of Birth (mm/dd/yyyy) as indicated and click Submit. You will be taken to the next sign-up page. 5. Create a [employer] ID. This will be your [employer] login ID and cannot be changed, so think of one that is secure and easy to remember. 6. Create a [employer] password. You can change your password at any time. 7. Enter your Password Reset Question and Answer. This can be used at a later time if you forget your password. 8. Enter your e-mail address. You will receive e-mail notification when new information is available in [address] 9. Click Sign Up. You can now view and download portions of your medical record. 10. Click the Download Summary menu link to download a portable copy of your medical information. If you have questions, please visit the Frequently Asked Questions section of the [employer] website. Remember, [employer] is NOT to be used for urgent needs. For medical emergencies, dial 911. Now available from your iPhone and Android! Please provide this summary of care documentation to your next provider. Your primary care clinician is listed as [address]. If you have any questions after today's visit, please call [phone].

## 2018-05-15 NOTE — PATIENT INSTRUCTIONS
You will need to follow up in clinic with Dr. David Gonzalez in 12 months.     You will be scheduled for an Echo

## 2018-05-18 ENCOUNTER — HOSPITAL ENCOUNTER (EMERGENCY)
Age: 63
Discharge: HOME | End: 2018-05-18
Attending: EMERGENCY MEDICINE | Admitting: EMERGENCY MEDICINE
Payer: MEDICARE

## 2018-05-18 ENCOUNTER — APPOINTMENT (OUTPATIENT)
Dept: GENERAL RADIOLOGY | Age: 63
End: 2018-05-18
Attending: EMERGENCY MEDICINE
Payer: MEDICARE

## 2018-05-18 VITALS
DIASTOLIC BLOOD PRESSURE: 67 MMHG | OXYGEN SATURATION: 97 % | HEIGHT: 67 IN | RESPIRATION RATE: 18 BRPM | TEMPERATURE: 97.6 F | SYSTOLIC BLOOD PRESSURE: 163 MMHG | BODY MASS INDEX: 29.93 KG/M2 | HEART RATE: 57 BPM | WEIGHT: 190.7 LBS

## 2018-05-18 DIAGNOSIS — S39.012A BACK STRAIN, INITIAL ENCOUNTER: ICD-10-CM

## 2018-05-18 DIAGNOSIS — M51.36 DEGENERATIVE DISC DISEASE, LUMBAR: ICD-10-CM

## 2018-05-18 DIAGNOSIS — S13.4XXA WHIPLASH INJURIES, INITIAL ENCOUNTER: Primary | ICD-10-CM

## 2018-05-18 PROCEDURE — 99283 EMERGENCY DEPT VISIT LOW MDM: CPT

## 2018-05-18 PROCEDURE — 72100 X-RAY EXAM L-S SPINE 2/3 VWS: CPT

## 2018-05-18 PROCEDURE — 74011250637 HC RX REV CODE- 250/637: Performed by: PHYSICIAN ASSISTANT

## 2018-05-18 RX ORDER — CYCLOBENZAPRINE HCL 10 MG
10 TABLET ORAL
Status: COMPLETED | OUTPATIENT
Start: 2018-05-18 | End: 2018-05-18

## 2018-05-18 RX ORDER — HYDROCODONE BITARTRATE AND ACETAMINOPHEN 5; 325 MG/1; MG/1
1 TABLET ORAL
Status: COMPLETED | OUTPATIENT
Start: 2018-05-18 | End: 2018-05-18

## 2018-05-18 RX ORDER — HYDROCODONE BITARTRATE AND ACETAMINOPHEN 5; 325 MG/1; MG/1
1 TABLET ORAL
Qty: 10 TAB | Refills: 0 | Status: SHIPPED | OUTPATIENT
Start: 2018-05-18 | End: 2018-08-18

## 2018-05-18 RX ORDER — CYCLOBENZAPRINE HCL 10 MG
10 TABLET ORAL
Qty: 20 TAB | Refills: 0 | Status: SHIPPED | OUTPATIENT
Start: 2018-05-18 | End: 2018-08-18

## 2018-05-18 RX ADMIN — HYDROCODONE BITARTRATE AND ACETAMINOPHEN 1 TABLET: 5; 325 TABLET ORAL at 20:51

## 2018-05-18 RX ADMIN — CYCLOBENZAPRINE HYDROCHLORIDE 10 MG: 10 TABLET, FILM COATED ORAL at 20:51
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
## 2018-05-19 NOTE — ED NOTES
Dc instructions per Dr. Viviane Yoo  All questions and concerns were addressed  Ambulated out of ER without difficulty.
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
## 2018-05-19 NOTE — DISCHARGE INSTRUCTIONS
Back Strain: Care Instructions  Your Care Instructions    Back strain happens when you overstretch, or pull, a muscle in your back. You may hurt your back in an accident or when you exercise or lift something. Most back pain will get better with rest and time. You can take care of yourself at home to help your back heal.  Follow-up care is a key part of your treatment and safety. Be sure to make and go to all appointments, and call your doctor if you are having problems. It's also a good idea to know your test results and keep a list of the medicines you take. How can you care for yourself at home? · Try to stay as active as you can, but stop or reduce any activity that causes pain. · Put ice or a cold pack on the sore muscle for 10 to 20 minutes at a time to stop swelling. Try this every 1 to 2 hours for 3 days (when you are awake) or until the swelling goes down. Put a thin cloth between the ice pack and your skin. · After 2 or 3 days, apply a heating pad on low or a warm cloth to your back. Some doctors suggest that you go back and forth between hot and cold treatments. · Take pain medicines exactly as directed. ¨ If the doctor gave you a prescription medicine for pain, take it as prescribed. ¨ If you are not taking a prescription pain medicine, ask your doctor if you can take an over-the-counter medicine. · Try sleeping on your side with a pillow between your legs. Or put a pillow under your knees when you lie on your back. These measures can ease pain in your lower back. · Return to your usual level of activity slowly. When should you call for help? Call 911 anytime you think you may need emergency care. For example, call if:  ? · You are unable to move a leg at all. ?Call your doctor now or seek immediate medical care if:  ? · You have new or worse symptoms in your legs, belly, or buttocks. Symptoms may include:  ¨ Numbness or tingling. ¨ Weakness. ¨ Pain.    ? · You lose bladder or bowel control. ? Watch closely for changes in your health, and be sure to contact your doctor if you are not getting better as expected. Where can you learn more? Go to http://jolanta-bailey.info/. Enter Z602 in the search box to learn more about \"Back Strain: Care Instructions. \"  Current as of: March 21, 2017  Content Version: 11.4  © 5982-8363 [employer]. Care instructions adapted under license by [employer] (which disclaims liability or warranty for this information). If you have questions about a medical condition or this instruction, always ask your healthcare professional. [place] [address] any warranty or liability for your use of this information. Neck Strain or Sprain: Rehab Exercises  Your Care Instructions  Here are some examples of typical rehabilitation exercises for your condition. Start each exercise slowly. Ease off the exercise if you start to have pain. Your doctor or physical therapist will tell you when you can start these exercises and which ones will work best for you. How to do the exercises  Neck rotation    1. Sit in a firm chair, or stand up straight. 2. Keeping your chin level, turn your head to the right, and hold for 15 to 30 seconds. 3. Turn your head to the left and hold for 15 to 30 seconds. 4. Repeat 2 to 4 times to each side. Neck stretches    1. Look straight ahead, and tip your right ear to your right shoulder. Do not let your left shoulder rise up as you tip your head to the right. 2. Hold for 15 to 30 seconds. 3. Tilt your head to the left. Do not let your right shoulder rise up as you tip your head to the left. 4. Hold for 15 to 30 seconds. 5. Repeat 2 to 4 times to each side. Forward neck flexion    1. Sit in a firm chair, or stand up straight. 2. Bend your head forward. 3. Hold for 15 to 30 seconds. 4. Repeat 2 to 4 times. Lateral (side) bend strengthening    1.  With your right hand, place your first two fingers on your right temple. 2. Start to bend your head to the side while using gentle pressure from your fingers to keep your head from bending. 3. Hold for about 6 seconds. 4. Repeat 8 to 12 times. 5. Switch hands and repeat the same exercise on your left side. Forward bend strengthening    1. Place your first two fingers of either hand on your forehead. 2. Start to bend your head forward while using gentle pressure from your fingers to keep your head from bending. 3. Hold for about 6 seconds. 4. Repeat 8 to 12 times. Neutral position strengthening    1. Using one hand, place your fingertips on the back of your head at the top of your neck. 2. Start to bend your head backward while using gentle pressure from your fingers to keep your head from bending. 3. Hold for about 6 seconds. 4. Repeat 8 to 12 times. Chin tuck    1. Lie on the floor with a rolled-up towel under your neck. Your head should be touching the floor. 2. Slowly bring your chin toward your chest.  3. Hold for a count of 6, and then relax for up to 10 seconds. 4. Repeat 8 to 12 times. Follow-up care is a key part of your treatment and safety. Be sure to make and go to all appointments, and call your doctor if you are having problems. It's also a good idea to know your test results and keep a list of the medicines you take. Where can you learn more? Go to http://jolanta-bailey.info/. Enter M679 in the search box to learn more about \"Neck Strain or Sprain: Rehab Exercises. \"  Current as of: March 21, 2017  Content Version: 11.4  © 0696-5146 [employer]. Care instructions adapted under license by [employer] (which disclaims liability or warranty for this information). If you have questions about a medical condition or this instruction, always ask your healthcare professional. [address] any warranty or liability for your use of this information. Learning About Degenerative Disc Disease  What is degenerative disc disease? Degenerative disc disease is not really a disease. It's a term used to describe the normal changes in your spinal discs as you age. Spinal discs are small, spongy discs that separate the bones (vertebrae) that make up the spine. The discs act as shock absorbers for the spine, so it can flex, bend, and twist.  Degenerative disc disease can take place in one or more places along the spine. It most often occurs in the discs in the lower back and the neck. What causes it? As we age, our spinal discs break down, or degenerate. This breakdown causes the symptoms of degenerative disc disease in some people. When the discs break down, they can lose fluid and dry out, and their outer layers can have tiny cracks or tears. This leads to less padding and less space between the bones in the spine. The body reacts to this by making bony growths on the spine called bone spurs. These spurs can press on the spinal nerve roots or spinal cord. This can cause pain and can affect how well the nerves work. What are the symptoms? Many people with degenerative disc disease have no pain. But others have severe pain or other symptoms that limit their activities. Some of the most common symptoms are:  · Pain in the back or neck. Where the pain occurs depends on which discs are affected. · Pain that gets worse when you move, such as bending over, reaching up, or twisting. · Pain that may occur in the rear end (buttocks), arm, or leg if a nerve is pinched. · Numbness or tingling in your arm or leg. How is it diagnosed? A doctor can often diagnose degenerative disc disease while doing a physical exam. If your exam shows no signs of a serious condition, imaging tests (such as an X-ray) aren't likely to help your doctor find the cause of your symptoms.   Sometimes degenerative disc disease is found when an X-ray is taken for another reason, such as an injury or other health problem. But even if the doctor finds degenerative disc disease, that doesn't always mean that you will have symptoms. How is it treated? There are several things you can do at home to manage pain from this problem. · To relieve pain, use ice or heat (whichever feels better) on the affected area. ¨ Put ice or a cold pack on the area for 10 to 20 minutes at a time. Put a thin cloth between the ice and your skin. ¨ Put a warm water bottle, a heating pad set on low, or a warm cloth on your back. Put a thin cloth between the heating pad and your skin. Do not go to sleep with a heating pad on your skin. · Ask your doctor if you can take acetaminophen (such as Tylenol) or nonsteroidal anti-inflammatory drugs, such as ibuprofen or naproxen. Your doctor can prescribe stronger medicines if needed. Be safe with medicines. Read and follow all instructions on the label. · Get some exercise every day. Exercise is one of the best ways to help your back feel better and stay better. It's best to start each exercise slowly. You may notice a little soreness, and that's okay. But if an exercise makes your pain worse, stop doing it. Here are things you can try:  ¨ Walking. It's the simplest and maybe the best activity for your back. It gets your blood moving and helps your muscles stay strong. ¨ Exercises that gently stretch and strengthen your stomach, back, and leg muscles. The stronger those muscles are, the better they're able to protect your back. If you have constant or severe pain in your back or spine, you may need other treatments, such as physical therapy. In some cases, your doctor may suggest surgery. Follow-up care is a key part of your treatment and safety. Be sure to make and go to all appointments, and call your doctor if you are having problems. It's also a good idea to know your test results and keep a list of the medicines you take. Where can you learn more?   Go to http://krys.info/. Enter Y002 in the search box to learn more about \"Learning About Degenerative Disc Disease. \"  Current as of: March 21, 2017  Content Version: 11.4  © 9739-2055 [employer]. Care instructions adapted under license by [employer] (which disclaims liability or warranty for this information). If you have questions about a medical condition or this instruction, always ask your healthcare professional. [place] [address] any warranty or liability for your use of this information. Whiplash: Care Instructions  Your Care Instructions  Whiplash occurs when your head is suddenly forced forward and then snapped backward, as might happen in a car accident or sports injury. This can cause pain and stiffness in your neck. Your head, chest, shoulders, and arms also may hurt. Most whiplash gets better with home care. Your doctor may advise you to take medicine to relieve pain or relax your muscles. He or she may suggest exercise and physical therapy to increase flexibility and relieve pain. You can try wearing a neck (cervical) collar to support your neck. For a while you probably will need to avoid lifting and other activities that can strain the neck. Follow-up care is a key part of your treatment and safety. Be sure to make and go to all appointments, and call your doctor if you are having problems. It's also a good idea to know your test results and keep a list of the medicines you take. How can you care for yourself at home? · Take pain medicines exactly as directed. ¨ If the doctor gave you a prescription medicine for pain, take it as prescribed. ¨ If you are not taking a prescription pain medicine, ask your doctor if you can take an over-the-counter medicine. ¨ Do not take two or more pain medicines at the same time unless the doctor told you to. Many pain medicines have acetaminophen, which is Tylenol.  Too much acetaminophen (Tylenol) can be harmful. · You can try using a soft foam collar to support your neck for short periods of time. You can buy one at most [employer]. Do not wear the collar more than 2 or 3 days unless your doctor tells you to. · You can try using heat and ice to see if it helps. ¨ Try using a heating pad on a low or medium setting for 15 to 20 minutes every 2 to 3 hours. Try a warm shower in place of one session with the heating pad. You can also buy single-use heat wraps that last up to 8 hours. ¨ You can also try an ice pack for 10 to 15 minutes every 2 to 3 hours. · Do not do anything that makes the pain worse. Take it easy for a couple of days. You can do your usual activities if they do not hurt your neck or put it at risk for more stress or injury. Avoid lifting, sports, or other activities that might strain your neck. · Try sleeping on a special neck pillow. Place it under your neck, not under your head. Placing a tightly rolled-up towel under your neck while you sleep will also work. If you use a neck pillow or rolled towel, do not use your regular pillow at the same time. · Once your neck pain is gone, do exercises to stretch your neck and back and make them stronger. Your doctor or physical therapist can tell you which exercises are best.  When should you call for help? Call 911 anytime you think you may need emergency care. For example, call if:  ? · You are unable to move an arm or a leg at all. ?Call your doctor now or seek immediate medical care if:  ? · You have new or worse symptoms in your arms, legs, chest, belly, or buttocks. Symptoms may include:  ¨ Numbness or tingling. ¨ Weakness. ¨ Pain. ? · You lose bladder or bowel control. ? Watch closely for changes in your health, and be sure to contact your doctor if:  ? · You are not getting better as expected. Where can you learn more? Go to http://jolanta-bailey.info/.   Enter O152 in the search box to learn more about \"Whiplash: Care Instructions. \"  Current as of: March 21, 2017  Content Version: 11.4  © 3031-2258 Healthwise, Incorporated. Care instructions adapted under license by [employer] (which disclaims liability or warranty for this information). If you have questions about a medical condition or this instruction, always ask your healthcare professional. [address] any warranty or liability for your use of this information.

## 2018-05-23 ENCOUNTER — CLINICAL SUPPORT (OUTPATIENT)
Dept: CARDIOLOGY CLINIC | Age: 63
End: 2018-05-23

## 2018-05-23 DIAGNOSIS — I51.7 LVH (LEFT VENTRICULAR HYPERTROPHY): Primary | ICD-10-CM

## 2018-05-30 ENCOUNTER — OFFICE VISIT (OUTPATIENT)
Dept: SURGERY | Age: 63
End: 2018-05-30

## 2018-05-30 VITALS
HEIGHT: 67 IN | RESPIRATION RATE: 20 BRPM | SYSTOLIC BLOOD PRESSURE: 130 MMHG | WEIGHT: 190.5 LBS | BODY MASS INDEX: 29.9 KG/M2 | OXYGEN SATURATION: 97 % | DIASTOLIC BLOOD PRESSURE: 80 MMHG | HEART RATE: 70 BPM | TEMPERATURE: 98.1 F

## 2018-05-30 DIAGNOSIS — E66.01 MORBID OBESITY (HCC): Primary | ICD-10-CM

## 2018-05-30 DIAGNOSIS — R63.5 WEIGHT GAIN: ICD-10-CM

## 2018-05-30 DIAGNOSIS — Z98.84 LAP-BAND SURGERY STATUS: ICD-10-CM

## 2018-05-30 NOTE — PROGRESS NOTES
Aurelio Grimaldo is a 58 y.o. male 9 years s/p lap gastric band down 91.5 pounds. Weight today is 190.5 pounds. Patient comes in office today requesting adjustment of lap band. Stated he has actually gained weight in the past 3 months. Stated his weight was previously down to 177 pounds. Stated he is having more hunger and snacking more. Patient stated doing well. No nausea/vomiting, no heartburn/reflux, no night-time cough, no fever, no chills, No shortness of breath, no chest pain, no abdominal pain. Denies dysphagia. Minimal measuring of meals. Snacking with chips and sweets. Drinking at least 40 ounces of water daily. Exercising by walking. No issues with urination or bowel habits. HPI    Review of Systems   Constitutional: Negative for chills, fever and malaise/fatigue. Respiratory: Negative for cough, sputum production and shortness of breath. Cardiovascular: Negative for chest pain, palpitations and leg swelling. Gastrointestinal: Negative for abdominal pain, blood in stool, constipation, diarrhea, heartburn, nausea and vomiting. Genitourinary: Negative for dysuria. Neurological: Negative for dizziness. Physical Exam   Constitutional: He is oriented to person, place, and time. He appears well-developed and well-nourished. No distress. Abdominal: Soft. Bowel sounds are normal. He exhibits no distension. There is no tenderness. There is no rebound and no guarding. Lap sites healed. Port is palpable. Musculoskeletal: Normal range of motion. He exhibits no edema. Neurological: He is alert and oriented to person, place, and time. Skin: Skin is warm and dry. No rash noted. No erythema. Psychiatric: He has a normal mood and affect. His behavior is normal. Thought content normal.   Blood pressure 130/80, pulse 70, temperature 98.1 °F (36.7 °C), resp. rate 20, height 5' 7\" (1.702 m), weight 190 lb 8 oz (86.4 kg), SpO2 97 %.         ASSESSMENT and PLAN  Morbid Obesity 9 years s/p lap gastric band down 91.5 pounds. Weight today is 190.5 pounds. Patient to be scheduled for Upper GI for evaluate of lap band and possible adjustment of lap band in office. Advised patient regard to diet that is high-protein, low-fat, low-sugar, limited carbohydrates. Measure 4 ounces with each cindy. If having a snack, foods that are protein or fiber rich. Still pay attention to behavioral factor and habits. No eating/drinking together, chew foods well, and portion control. Drink at least 40 ounces of non-carbonated, non-calorie beverages daily. Will request prior authorization for lap band adjustment in office. Patient verbalized understanding and questions were answered to the best of my knowledge and ability. Advised to call office if any questions/concerns. 17 minutes was spent with patient, greater than 50% of time spent counseling.

## 2018-05-30 NOTE — PROGRESS NOTES
1. Have you been to the ER, urgent care clinic since your last visit? Hospitalized since your last visit? No    2. Have you seen or consulted any other health care providers outside of the [address] [place] [address] since your last visit? Include any pap smears or colon screening.  No

## 2018-05-30 NOTE — MR AVS SNAPSHOT
[address] [address] 
[phone] Patient: Darwin Cifuentes MRN: [id number] VCB:[birth year] Visit Information Date & Time Provider Department Dept. Phone Encounter #  
 5/30/2018  3:00 PM Vicky Burt NP [place] [id number] [phone] [id number] Follow-up Instructions Routing History Your Appointments 6/6/2019  9:00 AM  
PACEMAKER with GUILLE3ELSY [facility] ([place] SCHEDUL[facility]) Appt Note: HM threshold/rc/ Dunn annual b  HM  
 [address]  
[phone]  
  
   
 [address] [place] [address]  
  
    
 6/6/2019  9:20 AM  
ESTABLISHED PATIENT with Josep Escobedo MD  
[facility] ([facility]) Appt Note: HM threshold/rc/ Dunn annual b  HM  
 [address] [place] [address] [address]  
[address] [address]  
  
    
  
 8/20/2018  2:30 PM  
[facility] ([place] [facility]EDUL[facility]) Appt Note: HM PPI/rc b 5-15-18  
 [address] [place] [address] [place] [address]  
[address] [address]  
  
    
 11/26/2018  8:00 AM  
[facility] ([place] [facility]EDUL[facility]) Appt Note: HM PPI/rc b  
 [address] [place]  
[phone]  
  
    
 2/27/2019  8:15 AM  
REMOTE OFFICE VISIT with [facility] ([place] SCHEDUL[facility]) Appt Note: HM PPI/rc b  
 [address]  
[phone] Upcoming Health Maintenance Date Due Hepatitis C Screening 1955 Pneumococcal 19-64 Highest Risk (1 of 3 - PCV13) 7/25/1974 DTaP/Tdap/Td series (1 - Tdap) 7/25/1976 FOBT Q 1 YEAR AGE 50-75 7/25/2005 ZOSTER VACCINE AGE 60> 5/25/2015 MEDICARE YEARLY EXAM 3/14/2018 Influenza Age 5 to Adult 8/1/2018 Allergies as of 5/30/2018  Review Complete On: 5/30/2018 By: Brigida Galeas NP No Known Allergies Current Immunizations  Never Reviewed No immunizations on file. Not reviewed this visit You Were Diagnosed With   
  
 Codes Comments Morbid obesity ([facility] [address]    -  Primary ICD-10-CM: E66.01 
ICD-9-CM: 278.01   
 LAP-BAND surgery status     ICD-10-CM: Z98.84 ICD-9-CM: V45.86 Weight gain     ICD-10-CM: R63.5 ICD-9-CM: 783.1 BMI 29.0-29.9,adult     ICD-10-CM: F45.20 ICD-9-CM: V85.25 Vitals BP Pulse Temp Resp Height(growth percentile) Weight(growth percentile) 130/80 70 98.1 °F (36.7 °C) 20 5' 7\" (1.702 m) 190 lb 8 oz (86.4 kg) SpO2 BMI Smoking Status 97% 29.84 kg/m2 Never Smoker BMI and BSA Data Body Mass Index Body Surface Area  
 29.84 kg/m 2 2.02 m 2 Preferred Pharmacy Pharmacy Name Phone [facility]/PHARMACY #[id number], [place] - [address] [phone] Your Updated Medication List  
  
   
This list is accurate as of 5/30/18  4:54 PM.  Always use your most recent med list. amLODIPine 5 mg tablet Commonly known as:  Napoleon Bars TAKE 1 TABLET EVERY DAY  
  
 aspirin delayed-release 81 mg tablet Take 81 mg by mouth daily. cholecalciferol (vitamin D3) 2,000 unit Tab Take  by mouth daily. cyclobenzaprine 10 mg tablet Commonly known as:  FLEXERIL Take 1 Tab by mouth three (3) times daily (with meals). FLOMAX 0.4 mg capsule Generic drug:  tamsulosin Take 0.4 mg by mouth daily. FOLBEE PLUS Tab tablet Generic drug:  b complex-vitamin c-folic acid 5mg Take 1 Tab by mouth daily. HYDROcodone-acetaminophen 5-325 mg per tablet Commonly known as:  [place]  
 Take 1 Tab by mouth every six (6) hours as needed for Pain. Max Daily Amount: 4 Tabs. metFORMIN 500 mg tablet Commonly known as:  GLUCOPHAGE  
500 mg two (2) times a day. multivitamin tablet Commonly known as:  ONE A DAY Take 1 Tab by mouth daily. mycophenolate mofetil 250 mg capsule Commonly known as:  CELLCEPT Take 250 mg by mouth daily. One Touch Delica 33 gauge Misc Generic drug:  lancets ONETOUCH ULTRA TEST strip Generic drug:  glucose blood VI test strips  
  
 predniSONE 5 mg tablet Commonly known as:  DELTASONE  
5 mg daily. SENSIPAR 30 mg tablet Generic drug:  cinacalcet Take 30 mg by mouth daily. tacrolimus 1 mg capsule Commonly known as:  PROGRAF Take 2 mg by mouth every twelve (12) hours. Anti rejection drug To-Do List   
 05/30/2018 Imaging:  XR UPPER GI SERIES W KUB Introducing [facility] & HEALTH SERVICES! [facility] Alexsandra introduces [employer] patient portal. Now you can access parts of your medical record, email your doctor's office, and request medication refills online. 1. In your internet browser, go to [email or web address] 2. Click on the First Time User? Click Here link in the Sign In box. You will see the New Member Sign Up page. 3. Enter your [employer] Access Code exactly as it appears below. You will not need to use this code after youve completed the sign-up process. If you do not sign up before the expiration date, you must request a new code. · [employer] Access Code: [email or web address] Expires: 8/13/2018  2:16 PM 
 
4. Enter the last four digits of your Social Security Number (xxxx) and Date of Birth (mm/dd/yyyy) as indicated and click Submit. You will be taken to the next sign-up page. 5. Create a [employer] ID. This will be your [employer] login ID and cannot be changed, so think of one that is secure and easy to remember. 6. Create a [employer] password. You can change your password at any time. 7. Enter your Password Reset Question and Answer. This can be used at a later time if you forget your password. 8. Enter your e-mail address. You will receive e-mail notification when new information is available in [address] 9. Click Sign Up. You can now view and download portions of your medical record. 10. Click the Download Summary menu link to download a portable copy of your medical information. If you have questions, please visit the Frequently Asked Questions section of the [employer] website. Remember, [employer] is NOT to be used for urgent needs. For medical emergencies, dial 911. Now available from your iPhone and Android! Please provide this summary of care documentation to your next provider. Your primary care clinician is listed as [address]. If you have any questions after today's visit, please call [phone].

## 2018-05-31 ENCOUNTER — TELEPHONE (OUTPATIENT)
Dept: CARDIOLOGY CLINIC | Age: 63
End: 2018-05-31

## 2018-05-31 NOTE — PROGRESS NOTES
Verified patient with two types of identifiers. Notified patient of results. Patient verbalized understanding and will call with any other questions.

## 2018-06-07 ENCOUNTER — HOSPITAL ENCOUNTER (OUTPATIENT)
Dept: GENERAL RADIOLOGY | Age: 63
Discharge: HOME | End: 2018-06-07
Attending: NURSE PRACTITIONER
Payer: MEDICARE

## 2018-06-07 ENCOUNTER — OFFICE VISIT (OUTPATIENT)
Dept: SURGERY | Age: 63
End: 2018-06-07

## 2018-06-07 VITALS
OXYGEN SATURATION: 98 % | BODY MASS INDEX: 28.88 KG/M2 | HEIGHT: 67 IN | HEART RATE: 69 BPM | TEMPERATURE: 97.4 F | WEIGHT: 184 LBS | DIASTOLIC BLOOD PRESSURE: 70 MMHG | RESPIRATION RATE: 20 BRPM | SYSTOLIC BLOOD PRESSURE: 144 MMHG

## 2018-06-07 DIAGNOSIS — R63.5 WEIGHT GAIN: ICD-10-CM

## 2018-06-07 DIAGNOSIS — E66.01 MORBID OBESITY (HCC): ICD-10-CM

## 2018-06-07 DIAGNOSIS — Z98.84 LAP-BAND SURGERY STATUS: ICD-10-CM

## 2018-06-07 DIAGNOSIS — Z46.51 ENCOUNTER FOR ADJUSTMENT OF GASTRIC LAP BAND: ICD-10-CM

## 2018-06-07 DIAGNOSIS — E66.01 MORBID OBESITY (HCC): Primary | ICD-10-CM

## 2018-06-07 PROCEDURE — 74241 XR UPPER GI SERIES W KUB: CPT

## 2018-06-07 NOTE — PROGRESS NOTES
1. Have you been to the ER, urgent care clinic since your last visit? Hospitalized since your last visit? No    2. Have you seen or consulted any other health care providers outside of the [address] [place] [address] since your last visit? Include any pap smears or colon screening.  No

## 2018-06-07 NOTE — MR AVS SNAPSHOT
[address] [place] [address] [address] [address] 
[phone] Patient: Yesenia Salas MRN: [id number] QGS:4/31/[birth year] Visit Information Date & Time Provider Department Dept. Phone Encounter #  
 6/7/2018 11:40 AM Chaz Randle NP [place] [phone] [id number] Follow-up Instructions Return in about 2 weeks (around 6/21/2018). Routing History Your Appointments 6/6/2019  9:00 AM  
PACEMAKER with GUILLE3ELSY [facility] ([place] [facility]) Appt Note: HM threshold/rc/ Dunn annual b  HM  
 [address]  
[phone]  
  
   
 [address]  
  
    
 6/6/2019  9:20 AM  
ESTABLISHED PATIENT with Aristides Cuevas MD  
[facility] ([facility]) Appt Note: HM threshold/rc/ Dunn annual b  HM  
 [address] [place] [address] [address]  
[address] [address]  
  
    
  
 8/20/2018  2:30 PM  
REMOTE OFFICE VISIT with David Lewis [facility] ([place] [facility]) Appt Note: HM PPI/rc b 5-15-18  
 [address] [facility] [address]  
[address] [address]  
  
    
 11/26/2018  8:00 AM  
REMOTE OFFICE VISIT with David [facility] ([place]) Appt Note: HM PPI/rc b  
 [address]  
[phone]  
  
    
 2/27/2019  8:15 AM  
REMOTE OFFICE VISIT with David Lewis [facility] ([place]) Appt Note: HM PPI/rc b  
 [address]  
[phone] Upcoming Health Maintenance Date Due Hepatitis C Screening 1955 Pneumococcal 19-64 Highest Risk (1 of 3 - PCV13) 7/25/1974 DTaP/Tdap/Td series (1 - Tdap) 7/25/1976 FOBT Q 1 YEAR AGE 50-75 7/25/2005 ZOSTER VACCINE AGE 60> 5/25/2015 MEDICARE YEARLY EXAM 3/14/2018 Influenza Age 5 to Adult 8/1/2018 Allergies as of 6/7/2018  Review Complete On: 6/7/2018 By: Cherylene Douglas, NP No Known Allergies Current Immunizations  Never Reviewed No immunizations on file. Not reviewed this visit You Were Diagnosed With   
  
 Codes Comments [facility]bid [facility] ([facility]    -  Primary ICD-10-CM: E66.01 
ICD-9-CM: 278.01   
 LAP-BAND surgery status     ICD-10-CM: Z98.84 ICD-9-CM: V45.86 Encounter for adjustment of gastric lap band     ICD-10-CM: Z46.51 
ICD-9-CM: V53.51   
 BMI 28.0-28.9,adult     ICD-10-CM: K27.91 
ICD-9-CM: V85.24 Vitals BP Pulse Temp Resp Height(growth percentile) Weight(growth percentile) 144/70 (BP 1 Location: Left arm, BP Patient Position: Sitting) 69 97.4 °F (36.3 °C) (Oral) 20 5' 7\" (1.702 m) 184 lb (83.5 kg) SpO2 BMI Smoking Status 98% 28.82 kg/m2 Never Smoker BMI and BSA Data Body Mass Index Body Surface Area  
 28.82 kg/m 2 1.99 m 2 Preferred Pharmacy Pharmacy Name Phone [facility]/PHARMACY #[id number]- [place], [place] - [address] [phone] Your Updated Medication List  
  
   
This list is accurate as of 6/7/18 12:03 PM.  Always use your most recent med list. amLODIPine 5 mg tablet Commonly known as:  Franky Jasminech TAKE 1 TABLET EVERY DAY  
  
 aspirin delayed-release 81 mg tablet Take 81 mg by mouth daily. cholecalciferol (vitamin D3) 2,000 unit Tab Take  by mouth daily. cyclobenzaprine 10 mg tablet Commonly known as:  FLEXERIL Take 1 Tab by mouth three (3) times daily (with meals). FLOMAX 0.4 mg capsule Generic drug:  tamsulosin Take 0.4 mg by mouth daily. FOLBEE PLUS Tab tablet Generic drug:  b complex-vitamin c-folic acid 5mg Take 1 Tab by mouth daily. HYDROcodone-acetaminophen 5-325 mg per tablet Commonly known as:  [address] Take 1 Tab by mouth every six (6) hours as needed for Pain. Max Daily Amount: 4 Tabs. metFORMIN 500 mg tablet Commonly known as:  GLUCOPHAGE  
500 mg two (2) times a day. multivitamin tablet Commonly known as:  ONE A DAY Take 1 Tab by mouth daily. mycophenolate mofetil 250 mg capsule Commonly known as:  CELLCEPT Take 250 mg by mouth daily. One Touch Delica 33 gauge Misc Generic drug:  lancets ONETOUCH ULTRA TEST strip Generic drug:  glucose blood VI test strips  
  
 predniSONE 5 mg tablet Commonly known as:  DELTASONE  
5 mg daily. SENSIPAR 30 mg tablet Generic drug:  cinacalcet Take 30 mg by mouth daily. tacrolimus 1 mg capsule Commonly known as:  PROGRAF Take 2 mg by mouth every twelve (12) hours. Anti rejection drug We Performed the Following ADJUSTMENT GASTRIC BAND [ [facility] [place]] Follow-up Instructions Return in about 2 weeks (around 6/21/2018). To-Do List   
 06/07/2018 Imaging:  XR UPPER GI SERIES W KUB Introducing \[facility]\"" & HEALTH SERVICES! [facility] introduces [employer] patient portal. Now you can access parts of your medical record, email your doctor's office, and request medication refills online. 1. In your internet browser, go to [email or web address] "[email or web address] 2. Click on the First Time User? Click Here link in the Sign In box. You will see the New Member Sign Up page. 3. Enter your [employer] Access Code exactly as it appears below. You will not need to use this code after youve completed the sign-up process. If you do not sign up before the expiration date, you must request a new code. · [employer] Access Code: [email or web address] Expires: 8/13/2018  2:16 PM 
 
4.  Enter the last four digits of your Social Security Number (xxxx) and Date of Birth (mm/dd/yyyy) as indicated and click Submit. You will be taken to the next sign-up page. 5. Create a [employer] ID. This will be your [employer] login ID and cannot be changed, so think of one that is secure and easy to remember. 6. Create a [employer] password. You can change your password at any time. 7. Enter your Password Reset Question and Answer. This can be used at a later time if you forget your password. 8. Enter your e-mail address. You will receive e-mail notification when new information is available in [address] 9. Click Sign Up. You can now view and download portions of your medical record. 10. Click the Download Summary menu link to download a portable copy of your medical information. If you have questions, please visit the Frequently Asked Questions section of the [employer] website. Remember, [employer] is NOT to be used for urgent needs. For medical emergencies, dial 911. Now available from your iPhone and Android! Please provide this summary of care documentation to your next provider. Your primary care clinician is listed as [address]. If you have any questions after today's visit, please call [phone].

## 2018-06-07 NOTE — PROGRESS NOTES
Lizet Soriano is a 58 y.o. male 9 years s/p lap gastric band, down 98 pounds. Weight today is 184 pounds. Patient underwent Upper GI evaluation of lap band for possible adjustment of lap band today. Stated he is having more hunger and snacking more. No nausea/vomiting, no heartburn/reflux, no night-time cough, no fever, no chills, No shortness of breath, no chest pain, no abdominal pain. Denies dysphagia. Minimal measuring of meals. Snacking with chips and sweets. Drinking at least 40 ounces of water daily. Exercising by walking. No issues with urination or bowel habits. HPI    ROS    Physical Exam    ASSESSMENT and PLAN  Morbid Obesity 9 years s/p lap gastric band, down 98 pounds. Weight today is 184 pounds    Upper GI reviewed with patient. Presence of pouch dilatation and anterior tilting of lap band. Recommend removal of lap band. Informed consent was obtained. Emergent un-fill performed. Patient was placed in the standing. position. The abdomen was prepped using sterile technique. The port was then accessed with ease. 7 ml was removed from the band. All fluid was removed from band, less than 1 mL. Patient tolerated the procedure well and without difficulty. Patient then tolerated 6 ounces of water without difficulty  Patient was instructed in full liquid diet for next 1-2 weeks, then advance diet as tolerated. Discussed with patient the importance behavior modification. Patient advised to measure 4 ounces with each meal, avoid eating/drinking, avoid carbonated beverages. Patient to have lap band re-evaluated for improvement with Upper GI in at least 2 weeks. Patient was advised to notify office if experience dysphagia, nausea/vomiting, reflux despite removal of some fluid from band. Patient verbalized understanding and questions were answered to the best of my knowledge and ability.  Advised if any questions or concerns to call the office.        20 minutes was spent with patient, greater than 50% of time spent counseling

## 2018-07-02 ENCOUNTER — TELEPHONE (OUTPATIENT)
Dept: SURGERY | Age: 63
End: 2018-07-02

## 2018-07-03 NOTE — TELEPHONE ENCOUNTER
Telephone with patient. Patient asking about repeat Upper Gi scheduled. Hasn't receive phone. Advised will send message for schedule of Upper GI and office appointment that same day. Advised if any questions or concerns to call the office.

## 2018-07-26 ENCOUNTER — HOSPITAL ENCOUNTER (OUTPATIENT)
Dept: GENERAL RADIOLOGY | Age: 63
Discharge: HOME | End: 2018-07-26
Attending: NURSE PRACTITIONER
Payer: MEDICARE

## 2018-07-26 ENCOUNTER — OFFICE VISIT (OUTPATIENT)
Dept: SURGERY | Age: 63
End: 2018-07-26

## 2018-07-26 VITALS
HEIGHT: 67 IN | TEMPERATURE: 98.4 F | DIASTOLIC BLOOD PRESSURE: 80 MMHG | BODY MASS INDEX: 30.84 KG/M2 | HEART RATE: 61 BPM | WEIGHT: 196.5 LBS | SYSTOLIC BLOOD PRESSURE: 140 MMHG | RESPIRATION RATE: 20 BRPM | OXYGEN SATURATION: 98 %

## 2018-07-26 DIAGNOSIS — Z98.84 LAP-BAND SURGERY STATUS: ICD-10-CM

## 2018-07-26 DIAGNOSIS — E66.01 MORBID OBESITY (HCC): Primary | ICD-10-CM

## 2018-07-26 DIAGNOSIS — Z46.51 ENCOUNTER FOR ADJUSTMENT OF GASTRIC LAP BAND: ICD-10-CM

## 2018-07-26 DIAGNOSIS — E66.01 MORBID OBESITY (HCC): ICD-10-CM

## 2018-07-26 PROCEDURE — 74241 XR UPPER GI SERIES W KUB: CPT

## 2018-07-26 NOTE — MR AVS SNAPSHOT
[address] [facility] [address] [id number] [phone] Patient: Jeff Benitez MRN: [id number]:[id number]/20[id number] Visit Information Date & Time Provider Department Dept. Phone Encounter #  
 7/26/2018 10:20 AM Claudia Galindo NP [place] [phone] [id number] Follow-up Instructions Return in about 8 weeks (around 9/20/2018). Your Appointments 6/6/2019  9:00 AM  
PACEMAKER with GUILLE3ELSY [facility] ([facility]) Appt Note: HM threshold/rc/ Dunn annual b  HM  
 [address] [place] [address]  
[phone]  
  
   
 [address] [place]  
  
    
 6/6/2019  9:20 AM  
ESTABLISHED PATIENT with Kwaku Dean MD  
[facility] ([facility]) Appt Note: HM threshold/rc/ Dunn annual b  HM  
 [address] [place] [id number]  
[address] [address]  
  
    
  
 8/20/2018  2:30 PM  
REMOTE OFFICE VISIT with Lamar Hook [facility] ([facility]) Appt Note: HM PPI/rc b 5-15-18  
 [address] [facility] [address]  
[address] [address]  
  
    
 11/26/2018  8:00 AM  
REMOTE OFFICE VISIT with Lamar Hook [facility] ([facility]) Appt Note: HM PPI/rc b  
 [address] [place]  
[phone]  
  
    
 2/27/2019  8:15 AM  
REMOTE OFFICE VISIT with Lamar Hook [facility] ([facility]) Appt Note: HM PPI/rc b  
 [address] [place]  
[phone] Upcoming Health Maintenance Date Due Hepatitis C Screening 1955 Pneumococcal 19-64 Highest Risk (1 of 3 - PCV13) 7/25/1974 DTaP/Tdap/Td series (1 - Tdap) 7/25/1976 FOBT Q 1 YEAR AGE 50-75 7/25/2005 ZOSTER VACCINE AGE 60> 5/25/2015 MEDICARE YEARLY EXAM 3/14/2018 Influenza Age 5 to Adult 8/1/2018 Allergies as of 7/26/2018  Review Complete On: 6/7/2018 By: Cheng Lopez NP No Known Allergies Current Immunizations  Never Reviewed No immunizations on file. Not reviewed this visit You Were Diagnosed With   
  
 Codes Comments [facility] ([facility]    -  Primary ICD-10-CM: E66.01 
ICD-9-CM: 278.01   
 LAP-BAND surgery status     ICD-10-CM: Z98.84 ICD-9-CM: V45.86 Encounter for adjustment of gastric lap band     ICD-10-CM: Z46.51 
ICD-9-CM: V53.51 BMI 30.0-30.9,adult     ICD-10-CM: Z68.30 ICD-9-CM: V85.30 Vitals BP Pulse Temp Resp Height(growth percentile) Weight(growth percentile) 140/80 61 98.4 °F (36.9 °C) 20 5' 7\" (1.702 m) 196 lb 8 oz (89.1 kg) SpO2 BMI Smoking Status 98% 30.78 kg/m2 Never Smoker BMI and BSA Data Body Mass Index Body Surface Area 30.78 kg/m 2 2.05 m 2 Preferred Pharmacy Pharmacy Name Phone [id number]- [place], [place] - [address] [phone] Your Updated Medication List  
  
   
This list is accurate as of 7/26/18 10:28 AM.  Always use your most recent med list. amLODIPine 5 mg tablet Commonly known as:  [place] Blade TAKE 1 TABLET EVERY DAY  
  
 aspirin delayed-release 81 mg tablet Take 81 mg by mouth daily. cholecalciferol (vitamin D3) 2,000 unit Tab Take  by mouth daily. cyclobenzaprine 10 mg tablet Commonly known as:  FLEXERIL Take 1 Tab by mouth three (3) times daily (with meals). FLOMAX 0.4 mg capsule Generic drug:  tamsulosin Take 0.4 mg by mouth daily. FOLBEE PLUS Tab tablet Generic drug:  b complex-vitamin c-folic acid 5mg Take 1 Tab by mouth daily. HYDROcodone-acetaminophen 5-325 mg per tablet Commonly known as:  [facility] Take 1 Tab by mouth every six (6) hours as needed for Pain. Max Daily Amount: 4 Tabs. metFORMIN 500 mg tablet Commonly known as:  GLUCOPHAGE  
500 mg two (2) times a day. multivitamin tablet Commonly known as:  ONE A DAY Take 1 Tab by mouth daily. mycophenolate mofetil 250 mg capsule Commonly known as:  CELLCEPT Take 250 mg by mouth daily. One Touch Delica 33 gauge Misc Generic drug:  lancets ONETOUCH ULTRA TEST strip Generic drug:  glucose blood VI test strips  
  
 predniSONE 5 mg tablet Commonly known as:  DELTASONE  
5 mg daily. SENSIPAR 30 mg tablet Generic drug:  cinacalcet Take 30 mg by mouth daily. tacrolimus 1 mg capsule Commonly known as:  PROGRAF Take 2 mg by mouth every twelve (12) hours. Anti rejection drug We Performed the Following ADJUSTMENT GASTRIC BAND [ [facility]] Follow-up Instructions Return in about 8 weeks (around 9/20/2018). Patient Instructions Post Lap Band Adjustment Instructions Your band is now more restrictive. Some swelling can occur at the band following a fill. Therefore, you should eat: 
 
Full liquid diet for 12-24 hours, then Soft and mushy foods for 2 days, then Resume regular food on the 4 th day. All meals should fit in a 4 ounce cup. (1/2 cup container) Choose food that require chewing, ideally foods rich in fiber and protein Avoid fatty and sugary food. Avoid snack food items. Eating Tips: 
Put down the fork between bites. Do not talk and eat at the same time. No drinking 30 minutes before or one hour after a meal. 
 
Call for an evaluation if you develop new reflux (heartburn), a night time cough or inability to tolerate solid foods. Our next regular follow- up appointment Introducing \[facility] [place] [facility]\"" & HEALTH SERVICES!    
 Josiah Short introduces [facility] [employer] patient portal. Now you can access parts of your medical record, email your doctor's office, and request medication refills online. 1. In your internet browser, go to [email or web address]. [email or web address] 2. Click on the First Time User? Click Here link in the Sign In box. You will see the New Member Sign Up page. 3. Enter your [employer] Access Code exactly as it appears below. You will not need to use this code after youve completed the sign-up process. If you do not sign up before the expiration date, you must request a new code. · [employer] Access Code: [email or web address] Expires: 8/13/2018  2:16 PM 
 
4. Enter the last four digits of your Social Security Number (xxxx) and Date of Birth (mm/dd/yyyy) as indicated and click Submit. You will be taken to the next sign-up page. 5. Create a [employer] ID. This will be your [employer] login ID and cannot be changed, so think of one that is secure and easy to remember. 6. Create a [employer] password. You can change your password at any time. 7. Enter your Password Reset Question and Answer. This can be used at a later time if you forget your password. 8. Enter your e-mail address. You will receive e-mail notification when new information is available in [address] 9. Click Sign Up. You can now view and download portions of your medical record. 10. Click the Download Summary menu link to download a portable copy of your medical information. If you have questions, please visit the Frequently Asked Questions section of the [employer] website. Remember, [employer] is NOT to be used for urgent needs. For medical emergencies, dial 911. Now available from your iPhone and Android! Please provide this summary of care documentation to your next provider. Your primary care clinician is listed as [address]. If you have any questions after today's visit, please call [phone].

## 2018-07-26 NOTE — PROGRESS NOTES
1. Have you been to the ER, urgent care clinic since your last visit? Hospitalized since your last visit? No    2. Have you seen or consulted any other health care providers outside of the [address] [place] [address] since your last visit? Include any pap smears or colon screening.  No

## 2018-07-26 NOTE — PROGRESS NOTES
Vadim Plasencia is a 61 y.o. male 9 years status post lap band, down 85.5 pounds. Weight today is 196.5 pounds. Patient has gained 12 pounds since last seen in June 2018. Patient comes in office today status post Upper GI to evaluate lap band. Patient had fluid removed from lap band at that time due to pouch dilatation found on Upper GI. Since then patient stated all symptoms have resolved and tolerating a lot of foods. Stated he wants fluid placed back in lap band. Denies nausea, no vomiting, no heartburn/reflux. Denies dysphagia. No fever/no chills, no shortness of breath, no chest pain, and no abdominal pain. No issues with urination or bowel habits. HPI    ROS    Physical Exam   Constitutional: He is oriented to person, place, and time. He appears well-developed and well-nourished. No distress. Cardiovascular: Normal rate, regular rhythm and normal heart sounds. Pulmonary/Chest: Effort normal and breath sounds normal. No respiratory distress. He has no wheezes. He has no rales. Abdominal: Soft. Bowel sounds are normal. He exhibits no distension. There is no tenderness. There is no rebound and no guarding. Lap sites healed. Port is palpable. Musculoskeletal: Normal range of motion. He exhibits no edema. Neurological: He is alert and oriented to person, place, and time. Skin: Skin is warm and dry. No rash noted. No erythema. Psychiatric: He has a normal mood and affect. His behavior is normal. Thought content normal.   Blood pressure 140/80, pulse 61, temperature 98.4 °F (36.9 °C), resp. rate 20, height 5' 7\" (1.702 m), weight 196 lb 8 oz (89.1 kg), SpO2 98 %. ASSESSMENT and PLAN  Morbid Obesity  9 years status post lap band, down 85.5 pounds. Weight today is 196.5 pounds. Upper GI reviewed with patient, resolution of pouch dilatation. No lap band slippage. Informed patient able to place fluid back in lap band. With verbal consent an office adjustment was performed today. Patient was placed in standing position. Abdomen was prepped using sterile technique, Suh needle easily accessed band. A total of 1 ml of normal saline was found in band, an additional 5.5 ml was added to band. Patient has 6.5 ml of normal saline in band. Patient then tolerated 6-8 oz fluid bolus with productive belch. Advised to stay on full diet for 12-24 hours, then advance diet as tolerated. If developed nausea, vomiting, dysphagia, heartburn, reflux, and/or nighttime cough advised to call office for possible removal of some fluid out of band. Yet if tolerating diet patient to follow up in office in  6 to 8 weeks. Advised patient to continue with diet that is high-protein, low-fat, low-sugar, and measuring 4 ounces at each meal. Also continue hydrating with at least  40 ounces of no-calorie, non-carbonated beverages. Advised to call office if any questions/concerns.

## 2018-08-17 ENCOUNTER — HOSPITAL ENCOUNTER (EMERGENCY)
Age: 63
Discharge: HOME | End: 2018-08-17
Attending: STUDENT IN AN ORGANIZED HEALTH CARE EDUCATION/TRAINING PROGRAM
Payer: MEDICARE

## 2018-08-17 ENCOUNTER — APPOINTMENT (OUTPATIENT)
Dept: GENERAL RADIOLOGY | Age: 63
End: 2018-08-17
Attending: PHYSICIAN ASSISTANT
Payer: MEDICARE

## 2018-08-17 VITALS
HEIGHT: 67 IN | TEMPERATURE: 98.7 F | RESPIRATION RATE: 16 BRPM | SYSTOLIC BLOOD PRESSURE: 140 MMHG | DIASTOLIC BLOOD PRESSURE: 72 MMHG | OXYGEN SATURATION: 95 % | BODY MASS INDEX: 30.54 KG/M2 | WEIGHT: 194.6 LBS | HEART RATE: 71 BPM

## 2018-08-17 DIAGNOSIS — R53.81 MALAISE AND FATIGUE: Primary | ICD-10-CM

## 2018-08-17 DIAGNOSIS — R53.83 MALAISE AND FATIGUE: Primary | ICD-10-CM

## 2018-08-17 DIAGNOSIS — R82.998 DARK URINE: ICD-10-CM

## 2018-08-17 DIAGNOSIS — R73.9 HYPERGLYCEMIA: ICD-10-CM

## 2018-08-17 LAB
ALBUMIN SERPL-MCNC: 3 G/DL (ref 3.5–5)
ALBUMIN/GLOB SERPL: 0.7 {RATIO} (ref 1.1–2.2)
ALP SERPL-CCNC: 90 U/L (ref 45–117)
ALT SERPL-CCNC: 13 U/L (ref 12–78)
ANION GAP SERPL CALC-SCNC: 9 MMOL/L (ref 5–15)
APPEARANCE UR: CLEAR
AST SERPL-CCNC: 11 U/L (ref 15–37)
BACTERIA URNS QL MICRO: ABNORMAL /HPF
BASOPHILS # BLD: 0 K/UL (ref 0–0.1)
BASOPHILS NFR BLD: 0 % (ref 0–1)
BILIRUB SERPL-MCNC: 0.9 MG/DL (ref 0.2–1)
BILIRUB UR QL CFM: NEGATIVE
BUN SERPL-MCNC: 22 MG/DL (ref 6–20)
BUN/CREAT SERPL: 17 (ref 12–20)
CALCIUM SERPL-MCNC: 9.7 MG/DL (ref 8.5–10.1)
CHLORIDE SERPL-SCNC: 95 MMOL/L (ref 97–108)
CO2 SERPL-SCNC: 26 MMOL/L (ref 21–32)
COLOR UR: ABNORMAL
COMMENT, HOLDF: NORMAL
CREAT SERPL-MCNC: 1.28 MG/DL (ref 0.7–1.3)
DIFFERENTIAL METHOD BLD: ABNORMAL
EOSINOPHIL # BLD: 0 K/UL (ref 0–0.4)
EOSINOPHIL NFR BLD: 0 % (ref 0–7)
EPITH CASTS URNS QL MICRO: ABNORMAL /LPF
ERYTHROCYTE [DISTWIDTH] IN BLOOD BY AUTOMATED COUNT: 13.4 % (ref 11.5–14.5)
GLOBULIN SER CALC-MCNC: 4.6 G/DL (ref 2–4)
GLUCOSE SERPL-MCNC: 235 MG/DL (ref 65–100)
GLUCOSE UR STRIP.AUTO-MCNC: 250 MG/DL
HCT VFR BLD AUTO: 46.4 % (ref 36.6–50.3)
HGB BLD-MCNC: 14.7 G/DL (ref 12.1–17)
HGB UR QL STRIP: ABNORMAL
IMM GRANULOCYTES # BLD: 0.1 K/UL (ref 0–0.04)
IMM GRANULOCYTES NFR BLD AUTO: 1 % (ref 0–0.5)
KETONES UR QL STRIP.AUTO: ABNORMAL MG/DL
LEUKOCYTE ESTERASE UR QL STRIP.AUTO: ABNORMAL
LYMPHOCYTES # BLD: 0.4 K/UL (ref 0.8–3.5)
LYMPHOCYTES NFR BLD: 4 % (ref 12–49)
MCH RBC QN AUTO: 28 PG (ref 26–34)
MCHC RBC AUTO-ENTMCNC: 31.7 G/DL (ref 30–36.5)
MCV RBC AUTO: 88.4 FL (ref 80–99)
MONOCYTES # BLD: 1 K/UL (ref 0–1)
MONOCYTES NFR BLD: 10 % (ref 5–13)
NEUTS SEG # BLD: 8.6 K/UL (ref 1.8–8)
NEUTS SEG NFR BLD: 85 % (ref 32–75)
NITRITE UR QL STRIP.AUTO: NEGATIVE
NRBC # BLD: 0 K/UL (ref 0–0.01)
NRBC BLD-RTO: 0 PER 100 WBC
PH UR STRIP: 5 [PH] (ref 5–8)
PLATELET # BLD AUTO: 213 K/UL (ref 150–400)
PLATELET COMMENTS,PCOM: ABNORMAL
PMV BLD AUTO: 9.8 FL (ref 8.9–12.9)
POTASSIUM SERPL-SCNC: 4.6 MMOL/L (ref 3.5–5.1)
PROT SERPL-MCNC: 7.6 G/DL (ref 6.4–8.2)
PROT UR STRIP-MCNC: 300 MG/DL
RBC # BLD AUTO: 5.25 M/UL (ref 4.1–5.7)
RBC #/AREA URNS HPF: ABNORMAL /HPF (ref 0–5)
RBC MORPH BLD: ABNORMAL
SAMPLES BEING HELD,HOLD: NORMAL
SODIUM SERPL-SCNC: 130 MMOL/L (ref 136–145)
SP GR UR REFRACTOMETRY: >1.03 (ref 1–1.03)
TROPONIN I SERPL-MCNC: <0.05 NG/ML
UR CULT HOLD, URHOLD: NORMAL
UROBILINOGEN UR QL STRIP.AUTO: 1 EU/DL (ref 0.2–1)
WBC # BLD AUTO: 10.1 K/UL (ref 4.1–11.1)
WBC URNS QL MICRO: ABNORMAL /HPF (ref 0–4)

## 2018-08-17 PROCEDURE — 36415 COLL VENOUS BLD VENIPUNCTURE: CPT | Performed by: PHYSICIAN ASSISTANT

## 2018-08-17 PROCEDURE — 81001 URINALYSIS AUTO W/SCOPE: CPT | Performed by: PHYSICIAN ASSISTANT

## 2018-08-17 PROCEDURE — 96361 HYDRATE IV INFUSION ADD-ON: CPT

## 2018-08-17 PROCEDURE — 99284 EMERGENCY DEPT VISIT MOD MDM: CPT

## 2018-08-17 PROCEDURE — 85025 COMPLETE CBC W/AUTO DIFF WBC: CPT | Performed by: PHYSICIAN ASSISTANT

## 2018-08-17 PROCEDURE — 87491 CHLMYD TRACH DNA AMP PROBE: CPT | Performed by: PHYSICIAN ASSISTANT

## 2018-08-17 PROCEDURE — 99283 EMERGENCY DEPT VISIT LOW MDM: CPT

## 2018-08-17 PROCEDURE — 84484 ASSAY OF TROPONIN QUANT: CPT | Performed by: PHYSICIAN ASSISTANT

## 2018-08-17 PROCEDURE — 96360 HYDRATION IV INFUSION INIT: CPT

## 2018-08-17 PROCEDURE — 71046 X-RAY EXAM CHEST 2 VIEWS: CPT

## 2018-08-17 PROCEDURE — 80053 COMPREHEN METABOLIC PANEL: CPT | Performed by: PHYSICIAN ASSISTANT

## 2018-08-17 PROCEDURE — 74011250637 HC RX REV CODE- 250/637: Performed by: STUDENT IN AN ORGANIZED HEALTH CARE EDUCATION/TRAINING PROGRAM

## 2018-08-17 PROCEDURE — 74011250636 HC RX REV CODE- 250/636: Performed by: STUDENT IN AN ORGANIZED HEALTH CARE EDUCATION/TRAINING PROGRAM

## 2018-08-17 PROCEDURE — 93005 ELECTROCARDIOGRAM TRACING: CPT

## 2018-08-17 RX ORDER — ACETAMINOPHEN 325 MG/1
650 TABLET ORAL
Status: DISCONTINUED | OUTPATIENT
Start: 2018-08-17 | End: 2018-08-17

## 2018-08-17 RX ORDER — ACETAMINOPHEN 325 MG/1
650 TABLET ORAL
Status: COMPLETED | OUTPATIENT
Start: 2018-08-17 | End: 2018-08-17

## 2018-08-17 RX ADMIN — SODIUM CHLORIDE 1000 ML: 900 INJECTION, SOLUTION INTRAVENOUS at 21:51

## 2018-08-17 RX ADMIN — ACETAMINOPHEN 650 MG: 325 TABLET ORAL at 22:07

## 2018-08-17 NOTE — ED TRIAGE NOTES
Patient arriving c/o HA x 5 days and penile discharge since last night. Patient reports fevers at home, but has not checked them. Patient reports being post kidney transplant.

## 2018-08-17 NOTE — ED NOTES
6:12 PM  I have evaluated the patient as the Provider in Triage. I have reviewed His vital signs and the triage nurse assessment. I have talked with the patient and any available family and advised that I am the provider in triage and have ordered the appropriate study to initiate their work up based on the clinical presentation during my assessment. I have advised that the patient will be accommodated in the Main ED as soon as possible. I have also requested to contact the triage nurse or myself immediately if the patient experiences any changes in their condition during this brief waiting period. Patient reports 4 day history of headache and penile discharge. History of kidney transplant. Endorses dysuria, urinary frequency, fever, sore throat, shortness of breath, mild cough.    Vinh Richards PA

## 2018-08-18 ENCOUNTER — HOSPITAL ENCOUNTER (EMERGENCY)
Age: 63
Discharge: HOME | End: 2018-08-18
Attending: EMERGENCY MEDICINE
Payer: MEDICARE

## 2018-08-18 ENCOUNTER — APPOINTMENT (OUTPATIENT)
Dept: CT IMAGING | Age: 63
End: 2018-08-18
Attending: EMERGENCY MEDICINE
Payer: MEDICARE

## 2018-08-18 VITALS
RESPIRATION RATE: 12 BRPM | BODY MASS INDEX: 29.9 KG/M2 | TEMPERATURE: 99.5 F | OXYGEN SATURATION: 96 % | HEART RATE: 63 BPM | DIASTOLIC BLOOD PRESSURE: 61 MMHG | SYSTOLIC BLOOD PRESSURE: 130 MMHG | HEIGHT: 67 IN | WEIGHT: 190.48 LBS

## 2018-08-18 DIAGNOSIS — J18.9 PNEUMONIA OF RIGHT UPPER LOBE DUE TO INFECTIOUS ORGANISM: Primary | ICD-10-CM

## 2018-08-18 LAB
ANION GAP SERPL CALC-SCNC: 7 MMOL/L (ref 5–15)
APPEARANCE UR: CLEAR
ATRIAL RATE: 73 BPM
BACTERIA URNS QL MICRO: NEGATIVE /HPF
BASOPHILS # BLD: 0 K/UL (ref 0–0.1)
BASOPHILS NFR BLD: 0 % (ref 0–1)
BILIRUB UR QL CFM: NEGATIVE
BUN SERPL-MCNC: 17 MG/DL (ref 6–20)
BUN/CREAT SERPL: 15 (ref 12–20)
CALCIUM SERPL-MCNC: 10.2 MG/DL (ref 8.5–10.1)
CALCULATED P AXIS, ECG09: 36 DEGREES
CALCULATED R AXIS, ECG10: -83 DEGREES
CALCULATED T AXIS, ECG11: 61 DEGREES
CHLORIDE SERPL-SCNC: 96 MMOL/L (ref 97–108)
CO2 SERPL-SCNC: 27 MMOL/L (ref 21–32)
COLOR UR: ABNORMAL
CREAT SERPL-MCNC: 1.1 MG/DL (ref 0.7–1.3)
DIAGNOSIS, 93000: NORMAL
DIFFERENTIAL METHOD BLD: ABNORMAL
EOSINOPHIL # BLD: 0 K/UL (ref 0–0.4)
EOSINOPHIL NFR BLD: 0 % (ref 0–7)
EPITH CASTS URNS QL MICRO: ABNORMAL /LPF
ERYTHROCYTE [DISTWIDTH] IN BLOOD BY AUTOMATED COUNT: 13.5 % (ref 11.5–14.5)
GLUCOSE SERPL-MCNC: 120 MG/DL (ref 65–100)
GLUCOSE UR STRIP.AUTO-MCNC: NEGATIVE MG/DL
HCT VFR BLD AUTO: 44.7 % (ref 36.6–50.3)
HGB BLD-MCNC: 14.2 G/DL (ref 12.1–17)
HGB UR QL STRIP: ABNORMAL
IMM GRANULOCYTES # BLD: 0 K/UL (ref 0–0.04)
IMM GRANULOCYTES NFR BLD AUTO: 0 % (ref 0–0.5)
KETONES UR QL STRIP.AUTO: 15 MG/DL
LACTATE SERPL-SCNC: 1.5 MMOL/L (ref 0.4–2)
LEUKOCYTE ESTERASE UR QL STRIP.AUTO: ABNORMAL
LYMPHOCYTES # BLD: 0.6 K/UL (ref 0.8–3.5)
LYMPHOCYTES NFR BLD: 6 % (ref 12–49)
MCH RBC QN AUTO: 27.1 PG (ref 26–34)
MCHC RBC AUTO-ENTMCNC: 31.8 G/DL (ref 30–36.5)
MCV RBC AUTO: 85.3 FL (ref 80–99)
MONOCYTES # BLD: 0.8 K/UL (ref 0–1)
MONOCYTES NFR BLD: 8 % (ref 5–13)
MUCOUS THREADS URNS QL MICRO: ABNORMAL /LPF
NEUTS BAND NFR BLD MANUAL: 15 %
NEUTS SEG # BLD: 9.1 K/UL (ref 1.8–8)
NEUTS SEG NFR BLD: 71 % (ref 32–75)
NITRITE UR QL STRIP.AUTO: NEGATIVE
NRBC # BLD: 0 K/UL (ref 0–0.01)
NRBC BLD-RTO: 0 PER 100 WBC
P-R INTERVAL, ECG05: 264 MS
PH UR STRIP: 5.5 [PH] (ref 5–8)
PLATELET # BLD AUTO: 218 K/UL (ref 150–400)
PMV BLD AUTO: 9.9 FL (ref 8.9–12.9)
POTASSIUM SERPL-SCNC: 4.5 MMOL/L (ref 3.5–5.1)
PROT UR STRIP-MCNC: 300 MG/DL
Q-T INTERVAL, ECG07: 388 MS
QRS DURATION, ECG06: 146 MS
QTC CALCULATION (BEZET), ECG08: 427 MS
RBC # BLD AUTO: 5.24 M/UL (ref 4.1–5.7)
RBC #/AREA URNS HPF: ABNORMAL /HPF (ref 0–5)
RBC MORPH BLD: ABNORMAL
SODIUM SERPL-SCNC: 130 MMOL/L (ref 136–145)
SP GR UR REFRACTOMETRY: 1.03 (ref 1–1.03)
UA: UC IF INDICATED,UAUC: ABNORMAL
UROBILINOGEN UR QL STRIP.AUTO: 1 EU/DL (ref 0.2–1)
VENTRICULAR RATE, ECG03: 73 BPM
WBC # BLD AUTO: 10.5 K/UL (ref 4.1–11.1)
WBC URNS QL MICRO: ABNORMAL /HPF (ref 0–4)

## 2018-08-18 PROCEDURE — 81001 URINALYSIS AUTO W/SCOPE: CPT | Performed by: EMERGENCY MEDICINE

## 2018-08-18 PROCEDURE — 96365 THER/PROPH/DIAG IV INF INIT: CPT

## 2018-08-18 PROCEDURE — 87040 BLOOD CULTURE FOR BACTERIA: CPT | Performed by: EMERGENCY MEDICINE

## 2018-08-18 PROCEDURE — 99284 EMERGENCY DEPT VISIT MOD MDM: CPT

## 2018-08-18 PROCEDURE — 74011250636 HC RX REV CODE- 250/636: Performed by: EMERGENCY MEDICINE

## 2018-08-18 PROCEDURE — 74176 CT ABD & PELVIS W/O CONTRAST: CPT

## 2018-08-18 PROCEDURE — 74011250637 HC RX REV CODE- 250/637: Performed by: EMERGENCY MEDICINE

## 2018-08-18 PROCEDURE — 96375 TX/PRO/DX INJ NEW DRUG ADDON: CPT

## 2018-08-18 PROCEDURE — 80048 BASIC METABOLIC PNL TOTAL CA: CPT | Performed by: EMERGENCY MEDICINE

## 2018-08-18 PROCEDURE — 96361 HYDRATE IV INFUSION ADD-ON: CPT

## 2018-08-18 PROCEDURE — 83605 ASSAY OF LACTIC ACID: CPT | Performed by: EMERGENCY MEDICINE

## 2018-08-18 PROCEDURE — 36415 COLL VENOUS BLD VENIPUNCTURE: CPT | Performed by: EMERGENCY MEDICINE

## 2018-08-18 PROCEDURE — 85025 COMPLETE CBC W/AUTO DIFF WBC: CPT | Performed by: EMERGENCY MEDICINE

## 2018-08-18 RX ORDER — LEVOFLOXACIN 500 MG/1
500 TABLET, FILM COATED ORAL DAILY
Qty: 7 TAB | Refills: 0 | Status: SHIPPED | OUTPATIENT
Start: 2018-08-18 | End: 2019-10-03

## 2018-08-18 RX ORDER — KETOROLAC TROMETHAMINE 30 MG/ML
15 INJECTION, SOLUTION INTRAMUSCULAR; INTRAVENOUS
Status: COMPLETED | OUTPATIENT
Start: 2018-08-18 | End: 2018-08-18

## 2018-08-18 RX ORDER — LEVOFLOXACIN 5 MG/ML
500 INJECTION, SOLUTION INTRAVENOUS
Status: COMPLETED | OUTPATIENT
Start: 2018-08-18 | End: 2018-08-18

## 2018-08-18 RX ORDER — ACETAMINOPHEN 500 MG
1000 TABLET ORAL
Status: COMPLETED | OUTPATIENT
Start: 2018-08-18 | End: 2018-08-18

## 2018-08-18 RX ADMIN — SODIUM CHLORIDE 1000 ML: 900 INJECTION, SOLUTION INTRAVENOUS at 11:21

## 2018-08-18 RX ADMIN — LEVOFLOXACIN 500 MG: 5 INJECTION, SOLUTION INTRAVENOUS at 12:00

## 2018-08-18 RX ADMIN — ACETAMINOPHEN 1000 MG: 500 TABLET ORAL at 11:07

## 2018-08-18 RX ADMIN — KETOROLAC TROMETHAMINE 15 MG: 30 INJECTION, SOLUTION INTRAMUSCULAR at 12:32

## 2018-08-18 NOTE — ED PROVIDER NOTES
EMERGENCY DEPARTMENT HISTORY AND PHYSICAL EXAM      Date: 8/18/2018  Patient Name: Jeff Benitez    History of Presenting Illness     Chief Complaint   Patient presents with    Urinary Frequency     With accompanied blood (noticed Friday)    Fever     Since Wednesday. Took tylenol at 0630       History Provided By: Patient    HPI: Jeff Benitez, 61 y.o. male with PMHx significant for HTN and DM, presents ambulatory to the ED with cc of sudden onset increased urine frequency, hematuria and B/L flank pain since yesterday with associated gradual onset, constant chills and a mild intensity HA x 4 days and a non-productive cough. Pain is unchanged by any position or activity. Blood in the urine is described as black and flaky. Pt reports that he was at [address] [place] [address] yesterday for the same symptoms where he was given fluids and was told he had no abnormal lab results. The continuation of his urinary symptoms prompted the trip to the ED today for evaluation. He denies any recent trauma. Pt specifically denies abd pain, nausea, vomiting, diarrhea, SOB or CP. There are no other complaints, changes, or physical findings at this time. PCP: Stacy Rios MD    Current Outpatient Prescriptions   Medication Sig Dispense Refill    levoFLOXacin (LEVAQUIN) 500 mg tablet Take 1 Tab by mouth daily. 7 Tab 0    tamsulosin (FLOMAX) 0.4 mg capsule Take 0.4 mg by mouth daily.  multivitamin (ONE A DAY) tablet Take 1 Tab by mouth daily.  aspirin delayed-release 81 mg tablet Take 81 mg by mouth daily.  amLODIPine (NORVASC) 5 mg tablet TAKE 1 TABLET EVERY DAY 30 Tab 5    mycophenolate (CELLCEPT) 250 mg capsule Take 250 mg by mouth daily.  ONETOUCH ULTRA TEST strip       ONE TOUCH DELICA 33 gauge misc   1    metFORMIN (GLUCOPHAGE) 500 mg tablet 500 mg two (2) times a day.  cholecalciferol, vitamin D3, 2,000 unit tab Take  by mouth daily.  predniSONE (DELTASONE) 5 mg tablet 5 mg daily.       tacrolimus (PROGRAF) 1 mg capsule Take 2 mg by mouth every twelve (12) hours. Anti rejection drug      b complex-vitamin c-folic acid 5mg (FOLBEE PLUS) Tab tablet Take 1 Tab by mouth daily. Past History     Past Medical History:  Past Medical History:   Diagnosis Date    [facility] [address] 2010    Right    Cancer [facility])     prostate - radiation    Chronic kidney disease     Chronic pain     Diabetes ([facility] [address]     h/o Gastric band     h/o successful Renal transplant 2014    Hypertension     Pacemaker 10/2015       Past Surgical History:  Past Surgical History:   Procedure Laterality Date    COLONOSCOPY N/A 4/25/2017    COLONOSCOPY performed by Marsha Guan MD at [address] HX PACEMAKER  10/29/15    not a defrillator    HX RENAL TRANSPLANT  2-16-14    [facility]    HX RENAL TRANSPLANT Right 2/2014    INS PPM/ICD LED DUAL ONLY  10/29/2015         LAP, PLACE ADJUST GASTR BAND      PENILE PUMP      twice - last 2/2017       Family History:  Family History   Problem Relation Age of Onset    Anesth Problems Neg Hx        Social History:  Social History   Substance Use Topics    Smoking status: Never Smoker    Smokeless tobacco: Never Used    Alcohol use No       Allergies:  No Known Allergies      Review of Systems   Review of Systems   Constitutional: Positive for chills. Negative for fever. Respiratory: Positive for cough. Negative for shortness of breath. Cardiovascular: Negative for chest pain. Gastrointestinal: Negative for constipation, diarrhea, nausea and vomiting. Genitourinary: Positive for flank pain (B/L), frequency and hematuria. Neurological: Positive for headaches. Negative for weakness and numbness. All other systems reviewed and are negative. Physical Exam   Physical Exam   Constitutional: He is oriented to person, place, and time. He appears well-developed and well-nourished. HENT:   Head: Normocephalic and atraumatic.    Eyes: Conjunctivae and EOM are normal. Neck: Normal range of motion. Neck supple. Cardiovascular: Normal rate and regular rhythm. No ST   Pulmonary/Chest: Effort normal and breath sounds normal. No respiratory distress. Abdominal: Soft. He exhibits no distension. There is no tenderness. Musculoskeletal: Normal range of motion. Neurological: He is alert and oriented to person, place, and time. Skin: Skin is dry. Very warm to touch   Psychiatric: He has a normal mood and affect. Nursing note and vitals reviewed. Diagnostic Study Results     Labs -     Recent Results (from the past 12 hour(s))   CBC WITH AUTOMATED DIFF    Collection Time: 08/18/18 11:23 AM   Result Value Ref Range    WBC 10.5 4.1 - 11.1 K/uL    RBC 5.24 4.10 - 5.70 M/uL    HGB 14.2 12.1 - 17.0 g/dL    HCT 44.7 36.6 - 50.3 %    MCV 85.3 80.0 - 99.0 FL    MCH 27.1 26.0 - 34.0 PG    MCHC 31.8 30.0 - 36.5 g/dL    RDW 13.5 11.5 - 14.5 %    PLATELET 347 205 - 878 K/uL    MPV 9.9 8.9 - 12.9 FL    NRBC 0.0 0  WBC    ABSOLUTE NRBC 0.00 0.00 - 0.01 K/uL    NEUTROPHILS 71 32 - 75 %    BAND NEUTROPHILS 15 %    LYMPHOCYTES 6 (L) 12 - 49 %    MONOCYTES 8 5 - 13 %    EOSINOPHILS 0 0 - 7 %    BASOPHILS 0 0 - 1 %    IMMATURE GRANULOCYTES 0 0.0 - 0.5 %    ABS. NEUTROPHILS 9.1 (H) 1.8 - 8.0 K/UL    ABS. LYMPHOCYTES 0.6 (L) 0.8 - 3.5 K/UL    ABS. MONOCYTES 0.8 0.0 - 1.0 K/UL    ABS. EOSINOPHILS 0.0 0.0 - 0.4 K/UL    ABS. BASOPHILS 0.0 0.0 - 0.1 K/UL    ABS. IMM.  GRANS. 0.0 0.00 - 0.04 K/UL    DF MANUAL      RBC COMMENTS NORMOCYTIC, NORMOCHROMIC     METABOLIC PANEL, BASIC    Collection Time: 08/18/18 11:23 AM   Result Value Ref Range    Sodium 130 (L) 136 - 145 mmol/L    Potassium 4.5 3.5 - 5.1 mmol/L    Chloride 96 (L) 97 - 108 mmol/L    CO2 27 21 - 32 mmol/L    Anion gap 7 5 - 15 mmol/L    Glucose 120 (H) 65 - 100 mg/dL    BUN 17 6 - 20 MG/DL    Creatinine 1.10 0.70 - 1.30 MG/DL    BUN/Creatinine ratio 15 12 - 20      GFR est AA >60 >60 ml/min/1.73m2    GFR est non-AA >60 >60 ml/min/1.73m2    Calcium 10.2 (H) 8.5 - 10.1 MG/DL   LACTIC ACID    Collection Time: 08/18/18 11:23 AM   Result Value Ref Range    Lactic acid 1.5 0.4 - 2.0 MMOL/L   URINALYSIS W/ REFLEX CULTURE    Collection Time: 08/18/18 11:23 AM   Result Value Ref Range    Color DARK YELLOW      Appearance CLEAR CLEAR      Specific gravity 1.030 1.003 - 1.030      pH (UA) 5.5 5.0 - 8.0      Protein 300 (A) NEG mg/dL    Glucose NEGATIVE  NEG mg/dL    Ketone 15 (A) NEG mg/dL    Blood SMALL (A) NEG      Urobilinogen 1.0 0.2 - 1.0 EU/dL    Nitrites NEGATIVE  NEG      Leukocyte Esterase TRACE (A) NEG      WBC 0-4 0 - 4 /hpf    RBC 5-10 0 - 5 /hpf    Epithelial cells FEW FEW /lpf    Bacteria NEGATIVE  NEG /hpf    UA:UC IF INDICATED CULTURE NOT INDICATED BY UA RESULT CNI      Mucus 1+ (A) NEG /lpf   BILIRUBIN, CONFIRM    Collection Time: 08/18/18 11:23 AM   Result Value Ref Range    Bilirubin UA, confirm NEGATIVE  NEG         Radiologic Studies -   CT Results  (Last 48 hours)               08/18/18 1134  CT ABD PELV WO CONT Final result    Impression:  IMPRESSION:       1. Right pelvic renal transplant. No hydronephrosis demonstrated. 2. Pacemaker, gastric banding, penile implant. 3. Nonspecific patchy atelectasis versus consolidation right middle lobe. 4. Cardiomegaly. Narrative:  EXAM:  CT ABD PELV WO CONT       INDICATION: Pain, flank colic/kidney; hematuria, flank pain, fever  urinary   frequency a couple day by blood noticed since Friday. Fever since Wednesday. COMPARISON: CT 4/21/2006       CONTRAST:  None. TECHNIQUE:    Thin axial images were obtained through the abdomen and pelvis. Coronal and   sagittal reconstructions were generated. Oral contrast was not administered. CT   dose reduction was achieved through use of a standardized protocol tailored for   this examination and automatic exposure control for dose modulation.         The absence of intravenous and oral contrast material reduces the sensitivity   for evaluation of the solid parenchymal organs and bowel. FINDINGS:    LUNG BASES: Partial visualization of nonspecific patchy atelectasis versus   consolidation in the right middle lobe. INCIDENTALLY IMAGED HEART AND MEDIASTINUM: Mild to moderate cardiac contour   enlargement. Pacemaker device. LIVER: No mass or biliary dilatation. GALLBLADDER: Unremarkable. SPLEEN: No mass. PANCREAS: No mass or ductal dilatation. ADRENALS: Unremarkable. KIDNEYS/URETERS: Small kidneys. Tiny nonobstructing calculi versus   atherosclerotic calcifications in kidneys bilaterally. No renal   pelvocaliectasis. Right pelvic renal transplant without evidence for   hydronephrosis. STOMACH: Artifacts related to gastric banding. SMALL BOWEL: Nondistended. COLON: Nondistended. APPENDIX: Unremarkable. PERITONEUM: No ascites or pneumoperitoneum. RETROPERITONEUM: No mass. Multiple small periaortic and aortocaval nodes. Abundant atherosclerotic calcifications including within small vessels. REPRODUCTIVE ORGANS: Penile implant. URINARY BLADDER: No mass or calculus. BONES: No destructive bone lesion. ADDITIONAL COMMENTS: N/A               CXR Results  (Last 48 hours)               08/17/18 1831  XR CHEST PA LAT Final result    Impression:  IMPRESSION: There is a right upper lobe airspace process suspicious for   pneumonia                       Narrative:  EXAM:  XR CHEST PA LAT       INDICATION:   shortness of breath       COMPARISON: 2017. FINDINGS: PA and lateral radiographs of the chest demonstrate pacer leads are   intact and there is a right upper lobe airspace process suspicious for   pneumonia. Left lung is clear. Mild cardiac megaly is stable. The bones and   soft tissues are within normal limits. Medical Decision Making   I am the first provider for this patient.     I reviewed the vital signs, available nursing notes, past medical history, past surgical history, family history and social history. Vital Signs-Reviewed the patient's vital signs. Patient Vitals for the past 12 hrs:   Temp Pulse Resp BP SpO2   08/18/18 1300 - 63 12 130/61 96 %   08/18/18 1257 99.5 °F (37.5 °C) - - - -   08/18/18 1255 - 69 21 - 93 %   08/18/18 1200 (!) 100.7 °F (38.2 °C) 71 24 145/65 95 %   08/18/18 1148 - 76 25 139/66 96 %   08/18/18 1100 100 °F (37.8 °C) 74 27 152/70 96 %       Pulse Oximetry Analysis - 96% on RA    Cardiac Monitor:   Rate: 74 bpm  Rhythm: Normal Sinus Rhythm     Records Reviewed: Nursing Notes and Old Medical Records    Provider Notes (Medical Decision Making):    Patient presents with fever and concerns for infection. Most likely UTI/kidneystone/PNA (based on XR yesterday)  DDx: sepsis 2/2 UTI, PNA, intraabdominal infection (colitis, appendicitis, cholecystitis),  infectious diarrhea, meningitis, soft tissue infection, septic arthritis, flu/viral prodrome. Will follow sepsis protocol and order set by obtaining fluids, antibiotics, labs, lactate, EKG and frequently reassessing hemodynamic status on the patient. Will hold off on aggressive fluid hydration unless patient shows signs of severe sepsis or shock         Reviewed chart from [facility], CXR showed PNA but was discharged without abx. Will repeat labs to ensure no signs of sepsis and treat with abx. Will get CT to r/o kidney stone due to Sx of increased frequency and hematuria. ED Course:   Initial assessment performed. The patients presenting problems have been discussed, and they are in agreement with the care plan formulated and outlined with them. I have encouraged them to ask questions as they arise throughout their visit. 12:57 PM  Pt has been re-evaluated and is ready for discharge. Critical Care Time:   0    Disposition:  DISCHARGE NOTE:  12:57 PM  The patient is ready for discharge.  The patients signs, symptoms, diagnosis, and instructions for discharge have been discussed and the pt has conveyed their understanding. The patient is to follow up as recommended or return to the ER should their symptoms worsen. Plan has been discussed and patient has conveyed their agreement. PLAN:  1. Discharge to home  Discharge Medication List as of 8/18/2018 12:41 PM      START taking these medications    Details   levoFLOXacin (LEVAQUIN) 500 mg tablet Take 1 Tab by mouth daily. , Normal, Disp-7 Tab, R-0         CONTINUE these medications which have NOT CHANGED    Details   tamsulosin (FLOMAX) 0.4 mg capsule Take 0.4 mg by mouth daily. , Historical Med      multivitamin (ONE A DAY) tablet Take 1 Tab by mouth daily. , Historical Med      aspirin delayed-release 81 mg tablet Take 81 mg by mouth daily. , Historical Med      amLODIPine (NORVASC) 5 mg tablet TAKE 1 TABLET EVERY DAY, Normal, Disp-30 Tab, R-5      mycophenolate (CELLCEPT) 250 mg capsule Take 250 mg by mouth daily. , Historical Med      ONETOUCH ULTRA TEST strip Historical Med      ONE TOUCH DELICA 33 gauge misc Historical Med, R-1      metFORMIN (GLUCOPHAGE) 500 mg tablet 500 mg two (2) times a day., Historical Med      cholecalciferol, vitamin D3, 2,000 unit tab Take  by mouth daily. , Historical Med      predniSONE (DELTASONE) 5 mg tablet 5 mg daily. , Historical Med      tacrolimus (PROGRAF) 1 mg capsule Take 2 mg by mouth every twelve (12) hours. Anti rejection drug, Historical Med      b complex-vitamin c-folic acid 5mg (FOLBEE PLUS) Tab tablet Take 1 Tab by mouth daily. , Historical Med           2. Follow-up Information     Follow up With Details Comments [address], [place]  As needed 2016 [facility]  [phone]          Return to ED if worse     Diagnosis     Clinical Impression:   1. Pneumonia of right upper lobe due to infectious organism [facility])        Attestations:     This note is prepared by Murray Pisano, acting as Scribe for Alberto Sharif MD.    Alberto Sharif MD: The scribe's documentation has been prepared under my direction and personally reviewed by me in its entirety. I confirm that the note above accurately reflects all work, treatment, procedures, and medical decision making performed by me.

## 2018-08-18 NOTE — DISCHARGE INSTRUCTIONS
Pneumonia: Care Instructions  Your Care Instructions    Pneumonia is an infection of the lungs. Most cases are caused by infections from bacteria or viruses. Pneumonia may be mild or very severe. If it is caused by bacteria, you will be treated with antibiotics. It may take a few weeks to a few months to recover fully from pneumonia, depending on how sick you were and whether your overall health is good. Follow-up care is a key part of your treatment and safety. Be sure to make and go to all appointments, and call your doctor if you are having problems. It's also a good idea to know your test results and keep a list of the medicines you take. How can you care for yourself at home? · Take your antibiotics exactly as directed. Do not stop taking the medicine just because you are feeling better. You need to take the full course of antibiotics. · Take your medicines exactly as prescribed. Call your doctor if you think you are having a problem with your medicine. · Get plenty of rest and sleep. You may feel weak and tired for a while, but your energy level will improve with time. · To prevent dehydration, drink plenty of fluids, enough so that your urine is light yellow or clear like water. Choose water and other caffeine-free clear liquids until you feel better. If you have kidney, heart, or liver disease and have to limit fluids, talk with your doctor before you increase the amount of fluids you drink. · Take care of your cough so you can rest. A cough that brings up mucus from your lungs is common with pneumonia. It is one way your body gets rid of the infection. But if coughing keeps you from resting or causes severe fatigue and chest-wall pain, talk to your doctor. He or she may suggest that you take a medicine to reduce the cough. · Use a vaporizer or humidifier to add moisture to your bedroom. Follow the directions for cleaning the machine. · Do not smoke or allow others to smoke around you.  Smoke will make your cough last longer. If you need help quitting, talk to your doctor about stop-smoking programs and medicines. These can increase your chances of quitting for good. · Take an over-the-counter pain medicine, such as acetaminophen (Tylenol), ibuprofen (Advil, Motrin), or naproxen (Aleve). Read and follow all instructions on the label. · Do not take two or more pain medicines at the same time unless the doctor told you to. Many pain medicines have acetaminophen, which is Tylenol. Too much acetaminophen (Tylenol) can be harmful. · If you were given a spirometer to measure how well your lungs are working, use it as instructed. This can help your doctor tell how your recovery is going. · To prevent pneumonia in the future, talk to your doctor about getting a flu vaccine (once a year) and a pneumococcal vaccine (one time only for most people). When should you call for help? Call 911 anytime you think you may need emergency care. For example, call if:    · You have severe trouble breathing.    Call your doctor now or seek immediate medical care if:    · You cough up dark brown or bloody mucus (sputum).     · You have new or worse trouble breathing.     · You are dizzy or lightheaded, or you feel like you may faint.    Watch closely for changes in your health, and be sure to contact your doctor if:    · You have a new or higher fever.     · You are coughing more deeply or more often.     · You are not getting better after 2 days (48 hours).     · You do not get better as expected. Where can you learn more? Go to http://jolanta-bailey.info/. Enter [id number] in the search box to learn more about \"Pneumonia: Care Instructions. \"  Current as of: December 6, 2017  Content Version: 11.7  © 6903-0497 [employer]. Care instructions adapted under license by [employer] (which disclaims liability or warranty for this information).  If you have questions about a medical condition or this instruction, always ask your healthcare professional. [place] [address] any warranty or liability for your use of this information.

## 2018-08-18 NOTE — ED NOTES
MD Brigid Adame reviewed discharge instructions with the patient. The patient verbalized understanding. Patient discharged home with stable vitals. Patient ambulatory out of ED with discharge instructions in hand. Opportunity for questions and clarification provided. No further complaints noted at this time.

## 2018-08-18 NOTE — ED NOTES
Patient presents to the ED with complaints of blood in his urine, urine frequency, fever, and chills. He is also complaining of bilateral flank pain. Patient denies prostate surgery, but is being followed by [facility] ([facility]). Patient states he had a fever this morning and took tylenol. Patient on the monitor x3, call bell within reach. Side rails x2. Wife at bedside. Patient states he went to [facility] yesterday for the same symptoms. Patient reports being discharged with a diagnosis of fatigue.  Patient states his symptoms are becoming worse

## 2018-08-18 NOTE — ED PROVIDER NOTES
HPI Comments: 61 y.o. male with past medical history significant for hypertension, diabetes, and successful renal transplant in 2014 who presents from home via a private vehicle with chief complaint of hematuria. Pt reports onset of hematuria was last night. Pt reports associated kidney pain which he describes as \"pretty bad\" on a scale of 1-10. Furthermore, pt also complains of chills, decreased appetite, headaches and generalized myalgias for since 8/7/18. Pt reports he has been trying Tylenol at home for his symptoms with no relief. Of note, pt's wife reports they just returned from a week long trip in [place], [place]. Pt reports he has been compliant with all his medications and has not missed any doses. Pt denies any know mosquito or tick bites. Pt denies eating any raw food while in [place]. Pt denies being on any anticoagulants. Pt denies any fevers, chest pain, abdominal pain, nausea, or vomiting. There are no other acute medical concerns at this time. Social hx - Tobacco use: none, Alcohol Use: none    PCP: Kamala Ellison MD    Note written by [address] [place] [address], as dictated by Carlos Pettit MD 8:47 PM.        The history is provided by the patient and the spouse. No  was used.         Past Medical History:   Diagnosis Date    A-V fistula ([facility] [address] 2010    Right    Cancer [facility])     prostate - radiation    Chronic kidney disease     Chronic pain     Diabetes ([facility] [address]     h/o Gastric band     h/o successful Renal transplant 2014    Hypertension     Pacemaker 10/2015       Past Surgical History:   Procedure Laterality Date    COLONOSCOPY N/A 4/25/2017    COLONOSCOPY performed by Tiffanie Gutierrez MD at [facility]  10/29/15    not a defrillator    HX RENAL TRANSPLANT  2-16-14    [facility]    HX RENAL TRANSPLANT Right 2/2014    INS PPM/ICD LED DUAL ONLY  10/29/2015         LAP, PLACE ADJUST GASTR BAND      PENILE PUMP      twice - last 2/2017 Family History:   Problem Relation Age of Onset    Anesth Problems Neg Hx        Social History     Social History    Marital status:      Spouse name: N/A    Number of children: N/A    Years of education: N/A     Occupational History    Not on file. Social History Main Topics    Smoking status: Never Smoker    Smokeless tobacco: Never Used    Alcohol use No    Drug use: No    Sexual activity: Not on file     Other Topics Concern    Not on file     Social History Narrative         ALLERGIES: Review of patient's allergies indicates no known allergies. Review of Systems   Constitutional: Positive for appetite change and chills. Negative for fever. HENT: Negative for ear pain and sore throat. Eyes: Negative for pain. Respiratory: Negative for chest tightness and shortness of breath. Cardiovascular: Negative for chest pain and leg swelling. Gastrointestinal: Negative for abdominal pain, nausea and vomiting. Genitourinary: Positive for hematuria. Negative for dysuria and flank pain. Musculoskeletal: Positive for myalgias (Generalized). Negative for back pain. Skin: Negative for rash. Neurological: Positive for headaches. All other systems reviewed and are negative. Vitals:    08/17/18 1817   BP: 147/78   Pulse: 79   Resp: 18   Temp: 98 °F (36.7 °C)   SpO2: 95%   Weight: 88.3 kg (194 lb 9.6 oz)   Height: 5' 7\" (1.702 m)            Physical Exam   Constitutional: He is oriented to person, place, and time. He appears well-developed. No distress. HENT:   Head: Normocephalic and atraumatic. Eyes: Conjunctivae and EOM are normal.   Neck: Normal range of motion. Neck supple. Cardiovascular: Normal rate, regular rhythm and normal heart sounds. No murmur heard. Pulmonary/Chest: Effort normal and breath sounds normal. No respiratory distress. Abdominal: Soft. Bowel sounds are normal. He exhibits no distension. There is tenderness. There is no rebound.    Nonfocal mild tenderness to palpation. Old surgical incision scar on abdomen from previous kidney transplant noted. Musculoskeletal: Normal range of motion. He exhibits no edema or tenderness. Neurological: He is alert and oriented to person, place, and time. No cranial nerve deficit. He exhibits normal muscle tone. Coordination normal.   Skin: Skin is warm and dry. Nursing note and vitals reviewed. Note written by Saira Carney, as dictated by Anthony Garcia MD 8:48 PM.    [facility]      ED Course       Procedures    ED EKG interpretation:  6:37 PM  Rhythm: sinus rhythm with sinus arrhythmia with 1st degree AV block; and regular. Rate (approx.): 73; RBBB, left anterior fascicular block, and bifascicular block  Note written by Saira Carney, as dictated by Anthony Garcia MD 10:02 PM.    Kidney function at baseline. UA not consistent with infection. He is hyperglycemic and this is likely contributing to his malaise. He will need close follow up with his transplant team at [facility] and I have instructed him to call first thing tomorrow as [facility] stabilized today. All questions answered. RTER precautions provided.

## 2018-08-18 NOTE — DISCHARGE INSTRUCTIONS
Fatigue: Care Instructions  Your Care Instructions    Fatigue is a feeling of tiredness, exhaustion, or lack of energy. You may feel fatigue because of too much or not enough activity. It can also come from stress, lack of sleep, boredom, and poor diet. Many medical problems, such as viral infections, can cause fatigue. Emotional problems, especially depression, are often the cause of fatigue. Fatigue is most often a symptom of another problem. Treatment for fatigue depends on the cause. For example, if you have fatigue because you have a certain health problem, treating this problem also treats your fatigue. If depression or anxiety is the cause, treatment may help. Follow-up care is a key part of your treatment and safety. Be sure to make and go to all appointments, and call your doctor if you are having problems. It's also a good idea to know your test results and keep a list of the medicines you take. How can you care for yourself at home? · Get regular exercise. But don't overdo it. Go back and forth between rest and exercise. · Get plenty of rest.  · Eat a healthy diet. Do not skip meals, especially breakfast.  · Reduce your use of caffeine, tobacco, and alcohol. Caffeine is most often found in coffee, tea, cola drinks, and chocolate. · Limit medicines that can cause fatigue. This includes tranquilizers and cold and allergy medicines. When should you call for help? Watch closely for changes in your health, and be sure to contact your doctor if:    · You have new symptoms such as fever or a rash.     · Your fatigue gets worse.     · You have been feeling down, depressed, or hopeless. Or you may have lost interest in things that you usually enjoy.     · You are not getting better as expected. Where can you learn more? Go to http://jolanta-bailey.info/. Enter K009 in the search box to learn more about \"Fatigue: Care Instructions. \"  Current as of: November 20, 2017  Content Version: 11.7  © 2611-3331 [employer]. Care instructions adapted under license by [employer] (which disclaims liability or warranty for this information). If you have questions about a medical condition or this instruction, always ask your healthcare professional. [address] any warranty or liability for your use of this information. Learning About High Blood Sugar  What is high blood sugar? Your body turns the food you eat into glucose (sugar), which it uses for energy. But if your body isn't able to use the sugar right away, it can build up in your blood and lead to high blood sugar. When the amount of sugar in your blood stays too high for too much of the time, you may have diabetes. Diabetes is a disease that can cause serious health problems. The good news is that lifestyle changes may help you get your blood sugar back to normal and avoid or delay diabetes. What causes high blood sugar? Sugar (glucose) can build up in your blood if you:  · Are overweight. · Have a family history of diabetes. · Take certain medicines, such as steroids. What are the symptoms? Having high blood sugar may not cause any symptoms at all. Or it may make you feel very thirsty or very hungry. You may also urinate more often than usual, have blurry vision, or lose weight without trying. How is high blood sugar treated? You can take steps to lower your blood sugar level if you understand what makes it get higher. Your doctor may want you to learn how to test your blood sugar level at home. Then you can see how illness, stress, or different kinds of food or medicine raise or lower your blood sugar level. Other tests may be needed to see if you have diabetes. How can you prevent high blood sugar? · Watch your weight. If you're overweight, losing just a small amount of weight may help. Reducing fat around your waist is most important.   · Limit the amount of calories, sweets, and unhealthy fat you eat. Ask your doctor if a dietitian can help you. A registered dietitian can help you create meal plans that fit your lifestyle. · Get at least 30 minutes of exercise on most days of the week. Exercise helps control your blood sugar. It also helps you maintain a healthy weight. Walking is a good choice. You also may want to do other activities, such as running, swimming, cycling, or playing tennis or team sports. · If your doctor prescribed medicines, take them exactly as prescribed. Call your doctor if you think you are having a problem with your medicine. You will get more details on the specific medicines your doctor prescribes. Follow-up care is a key part of your treatment and safety. Be sure to make and go to all appointments, and call your doctor if you are having problems. It's also a good idea to know your test results and keep a list of the medicines you take. Where can you learn more? Go to http://jolanta-bailey.info/. Enter O108 in the search box to learn more about \"Learning About High Blood Sugar. \"  Current as of: December 7, 2017  Content Version: 11.7  © 1301-2718 [employer]. Care instructions adapted under license by [employer] (which disclaims liability or warranty for this information). If you have questions about a medical condition or this instruction, always ask your healthcare professional. [address] any warranty or liability for your use of this information.

## 2018-08-20 ENCOUNTER — OFFICE VISIT (OUTPATIENT)
Dept: CARDIOLOGY CLINIC | Age: 63
End: 2018-08-20

## 2018-08-20 DIAGNOSIS — Z95.0 CARDIAC PACEMAKER IN SITU: Primary | ICD-10-CM

## 2018-08-20 LAB
C TRACH DNA SPEC QL NAA+PROBE: NEGATIVE
N GONORRHOEA DNA SPEC QL NAA+PROBE: NEGATIVE
SAMPLE TYPE: NORMAL
SERVICE CMNT-IMP: NORMAL
SPECIMEN SOURCE: NORMAL

## 2018-08-23 LAB
BACTERIA SPEC CULT: NORMAL
SERVICE CMNT-IMP: NORMAL

## 2018-09-05 ENCOUNTER — OFFICE VISIT (OUTPATIENT)
Dept: SURGERY | Age: 63
End: 2018-09-05

## 2018-09-05 VITALS
WEIGHT: 192.5 LBS | HEIGHT: 67 IN | TEMPERATURE: 98 F | SYSTOLIC BLOOD PRESSURE: 134 MMHG | BODY MASS INDEX: 30.21 KG/M2 | DIASTOLIC BLOOD PRESSURE: 74 MMHG | RESPIRATION RATE: 18 BRPM | HEART RATE: 66 BPM | OXYGEN SATURATION: 98 %

## 2018-09-05 DIAGNOSIS — E66.01 MORBID OBESITY (HCC): Primary | ICD-10-CM

## 2018-09-05 DIAGNOSIS — Z98.84 LAP-BAND SURGERY STATUS: ICD-10-CM

## 2018-09-05 DIAGNOSIS — Z46.51 ENCOUNTER FOR ADJUSTMENT OF GASTRIC LAP BAND: ICD-10-CM

## 2018-09-05 NOTE — PROGRESS NOTES
Huma Smith is a 61 y.o. male 9 years post lap gastric banding down  89.5 pounds. Weight today is 192.5 pounds. Patient comes in office today requesting adjustment of lap band. Stated he has actually gained weight in the past 3 months. Stated his weight was previously down to 177 pounds. Stated he is having more hunger and snacking more. Patient stated doing well. No nausea/vomiting, no heartburn/reflux, no night-time cough, no fever, no chills, No shortness of breath, no chest pain, no abdominal pain. Denies dysphagia. Minimal measuring of meals. Snacking with chips and sweets. Drinking at least 40 ounces of water daily. Exercising by walking. No issues with urination or bowel habits. HPI    Review of Systems   Constitutional: Negative for chills, fever and malaise/fatigue. Respiratory: Negative for cough, sputum production and shortness of breath. Cardiovascular: Negative for chest pain, palpitations and leg swelling. Gastrointestinal: Negative for abdominal pain, blood in stool, constipation, diarrhea, heartburn, nausea and vomiting. Genitourinary: Negative for dysuria. Neurological: Negative for dizziness. Physical Exam   Constitutional: He is oriented to person, place, and time. He appears well-developed and well-nourished. No distress. Abdominal: Soft. Bowel sounds are normal. He exhibits no distension. There is no tenderness. There is no rebound and no guarding. Lap sites healed. Port is palpable   Neurological: He is alert and oriented to person, place, and time. Skin: Skin is warm and dry. No rash noted. No erythema. Psychiatric: He has a normal mood and affect. His behavior is normal. Thought content normal.   Blood pressure 134/74, pulse 66, temperature 98 °F (36.7 °C), temperature source Oral, resp. rate 18, height 5' 7\" (1.702 m), weight 192 lb 8 oz (87.3 kg), SpO2 98 %. ASSESSMENT and PLAN  \Morbid Obesity 9 years post lap gastric banding down  89.5 pounds. Weight today is 192.5 pounds. With verbal consent an office adjustment was performed today. Patient was placed in standing position. Abdomen was prepped using sterile technique, Suh needle easily accessed band. A total of  6.5 ml of normal saline is suppose to be in lap band, an additional 0.75 ml was added to band. Patient has 7.25 ml of normal saline in band. Patient then tolerated 6-8 oz fluid bolus with productive belch. Advised to stay on full diet for 12-24 hours, then advance diet as tolerated. If developed nausea, vomiting, dysphagia, heartburn, reflux, and/or nighttime cough advised to call office for possible removal of some fluid out of band. Yet if tolerating diet patient to follow up in office in 6 to 8 weeks. Advised patient to continue with diet that is high-protein, low-fat, low-sugar, and measuring 4 ounces at each meal. Also continue hydrating with at least  40 ounces of no-calorie, non-carbonated beverages. Advised to call office if any questions/concerns.

## 2018-09-05 NOTE — PROGRESS NOTES
1. Have you been to the ER, urgent care clinic since your last visit? Hospitalized since your last visit? No    2. Have you seen or consulted any other health care providers outside of the [address] [place] [address] since your last visit? Include any pap smears or colon screening.  No

## 2018-09-05 NOTE — MR AVS SNAPSHOT
[address] [address] 
[phone] Patient: Raymond Rafi MRN: [id number] Visit Information Date & Time Provider Department Dept. Phone Encounter #  
 9/5/2018 11:40 AM Shirley King NP [place] [id number] [phone] [id number] Follow-up Instructions Return in about 8 weeks (around 10/31/2018). Your Appointments 6/6/2019  9:00 AM  
PACEMAKER with PACEMAKER3ELSY [facility] ([facility]) Appt Note: HM threshold/rc/ Dunn annual b  HM  
 [address] [place] [address]  
[phone]  
  
   
 [address]  
  
    
 6/6/2019  9:20 AM  
ESTABLISHED PATIENT with Ekaterina Johnosn MD  
[facility] ([facility]) Appt Note: HM threshold/rc/ Dunn annual b  HM  
 [address] [facility] [address]  
[address] [address]  
  
    
  
 11/26/2018  8:00 AM  
REMOTE OFFICE VISIT with Deshaun Moreland [facility] ([facility]EDUL[facility]) Appt Note: HM PPI/rc b  
 [address] [place] [address] [address]  
[address] [address]  
  
    
 2/27/2019  8:15 AM  
REMOTE OFFICE VISIT with Deshaun Moreland [facility] ([facility]EDUL[facility]) Appt Note: HM PPI/rc b  
 [address] [place] [address]  
[phone] Upcoming Health Maintenance Date Due Hepatitis C Screening 1955 Pneumococcal 19-64 Highest Risk (1 of 3 - PCV13) 7/25/1974 DTaP/Tdap/Td series (1 - Tdap) 7/25/1976 FOBT Q 1 YEAR AGE 50-75 7/25/2005 ZOSTER VACCINE AGE 60> 5/25/2015 MEDICARE YEARLY EXAM 3/14/2018 Influenza Age 5 to Adult 8/1/2018 Allergies as of 9/5/2018  Review Complete On: 9/5/2018 By: Belkis Coleman NP No Known Allergies Current Immunizations  Never Reviewed No immunizations on file. Not reviewed this visit You Were Diagnosed With   
  
 Codes Comments Morbid obesity ([facility] [address]    -  Primary ICD-10-CM: E66.01 
ICD-9-CM: 278.01   
 LAP-BAND surgery status     ICD-10-CM: Z98.84 ICD-9-CM: V45.86 Encounter for adjustment of gastric lap band     ICD-10-CM: Z46.51 
ICD-9-CM: V53.51 BMI 30.0-30.9,adult     ICD-10-CM: Z68.30 ICD-9-CM: V85.30 Vitals BP Pulse Temp Resp Height(growth percentile) Weight(growth percentile) 134/74 (BP 1 Location: Left arm, BP Patient Position: Sitting) 66 98 °F (36.7 °C) (Oral) 18 5' 7\" (1.702 m) 192 lb 8 oz (87.3 kg) SpO2 BMI Smoking Status 98% 30.15 kg/m2 Never Smoker BMI and BSA Data Body Mass Index Body Surface Area  
 30.15 kg/m 2 2.03 m 2 Preferred Pharmacy Pharmacy Name Phone [id number]- [place], [place] - [address] [phone] Your Updated Medication List  
  
   
This list is accurate as of 9/5/18 12:44 PM.  Always use your most recent med list. amLODIPine 5 mg tablet Commonly known as:  Santy Lesviaer TAKE 1 TABLET EVERY DAY  
  
 aspirin delayed-release 81 mg tablet Take 81 mg by mouth daily. cholecalciferol (vitamin D3) 2,000 unit Tab Take  by mouth daily. FLOMAX 0.4 mg capsule Generic drug:  tamsulosin Take 0.4 mg by mouth daily. FOLBEE PLUS Tab tablet Generic drug:  b complex-vitamin c-folic acid 5mg Take 1 Tab by mouth daily. levoFLOXacin 500 mg tablet Commonly known as:  Nataliia Jason Take 1 Tab by mouth daily. metFORMIN 500 mg tablet Commonly known as:  GLUCOPHAGE  
500 mg two (2) times a day. multivitamin tablet Commonly known as:  ONE A DAY Take 1 Tab by mouth daily. mycophenolate mofetil 250 mg capsule Commonly known as:  CELLCEPT Take 250 mg by mouth daily. One Touch Delica 33 gauge Misc Generic drug:  lancets ONETOUCH ULTRA TEST strip Generic drug:  glucose blood VI test strips  
  
 predniSONE 5 mg tablet Commonly known as:  DELTASONE  
5 mg daily. tacrolimus 1 mg capsule Commonly known as:  PROGRAF Take 2 mg by mouth every twelve (12) hours. Anti rejection drug We Performed the Following ADJUSTMENT GASTRIC BAND [ [facility]] Follow-up Instructions Return in about 8 weeks (around 10/31/2018). Introducing [facility]! [employer] introduces [employer] patient portal. Now you can access parts of your medical record, email your doctor's office, and request medication refills online. 1. In your internet browser, go to [email or web address]. [email or web address] 2. Click on the First Time User? Click Here link in the Sign In box. You will see the New Member Sign Up page. 3. Enter your [employer] Access Code exactly as it appears below. You will not need to use this code after youve completed the sign-up process. If you do not sign up before the expiration date, you must request a new code. · [employer] Access Code: [email or web address] Expires: 11/15/2018  6:14 PM 
 
4. Enter the last four digits of your Social Security Number (xxxx) and Date of Birth (mm/dd/yyyy) as indicated and click Submit. You will be taken to the next sign-up page. 5. Create a Solaiemest ID. This will be your [employer]x login ID and cannot be changed, so think of one that is secure and easy to remember. 6. Create a [employer]x password. You can change your password at any time. 7. Enter your Password Reset Question and Answer. This can be used at a later time if you forget your password. 8. Enter your e-mail address. You will receive e-mail notification when new information is available in [address] 9. Click Sign Up. You can now view and download portions of your medical record. 10. Click the Download Summary menu link to download a portable copy of your medical information. If you have questions, please visit the Frequently Asked Questions section of the [employer] website. Remember, [employer] is NOT to be used for urgent needs. For medical emergencies, dial 911. Now available from your iPhone and Android! Please provide this summary of care documentation to your next provider. Your primary care clinician is listed as [address]. If you have any questions after today's visit, please call [phone].

## 2018-11-19 ENCOUNTER — OFFICE VISIT (OUTPATIENT)
Dept: SURGERY | Age: 63
End: 2018-11-19

## 2018-11-19 VITALS
BODY MASS INDEX: 32.65 KG/M2 | HEIGHT: 67 IN | SYSTOLIC BLOOD PRESSURE: 150 MMHG | DIASTOLIC BLOOD PRESSURE: 82 MMHG | TEMPERATURE: 98.6 F | HEART RATE: 68 BPM | RESPIRATION RATE: 18 BRPM | WEIGHT: 208 LBS | OXYGEN SATURATION: 98 %

## 2018-11-19 DIAGNOSIS — E66.9 OBESITY (BMI 30.0-34.9): Primary | ICD-10-CM

## 2018-11-19 DIAGNOSIS — Z46.51 ADMISSION FOR ADJUSTMENT OF GASTRIC LAP BAND: ICD-10-CM

## 2018-11-19 DIAGNOSIS — R63.5 WEIGHT GAIN: ICD-10-CM

## 2018-11-19 NOTE — PROGRESS NOTES
1. Have you been to the ER, urgent care clinic since your last visit? Hospitalized since your last visit? No    2. Have you seen or consulted any other health care providers outside of the [address] [place] [address] since your last visit? Include any pap smears or colon screening.  No

## 2018-11-19 NOTE — PROGRESS NOTES
Subjective: Dinorah Goff is a 61 y.o. male with previous lap band surgery, who is here today needing lap band adjustment because of early sense of hunger (within 1-2 hours after eating) and weight gain. Dietary compliance is fair, but \"eating too much\". Can tolerate breads and \"things I wasn't eating\". No reflux, night-time cough, heartburn or regurgitation. Objective:     Visit Vitals  /82 (BP 1 Location: Left arm, BP Patient Position: Sitting)   Pulse 68   Temp 98.6 °F (37 °C) (Oral)   Resp 18   Ht 5' 7\" (1.702 m)   Wt 208 lb (94.3 kg)   SpO2 98%   BMI 32.58 kg/m²      Estimated body mass index is 32.58 kg/m² as calculated from the following:    Height as of this encounter: 5' 7\" (1.702 m). Weight as of this encounter: 208 lb (94.3 kg). Wt Readings from Last 3 Encounters:   11/19/18 208 lb (94.3 kg)   09/05/18 192 lb 8 oz (87.3 kg)   08/18/18 190 lb 7.6 oz (86.4 kg)     His change in weight since last visit: gained, 10 lbs. A + O x 3  Chest  CTA  COR  RRR  ABD Soft, NT/ND, +BS, port palpable and non tender. EXT No edema; ambulating independently        Assessment/Plan: Morbid Obesity, status post lap-band surgery, needing fill adjustment  Reviewed images in Epic   Lap Band FiIl Procedure    Verbal consent was obtained. The patient was placed in the standing position. The port area was prepped using sterile technique and a Suh needle was inserted. The port was accessed with ease. Previous Fill Volume:  7.25 cc. Added:  o.5 cc  Reviewed Red Zone symptoms   Diet progression reviewed    Patient tolerated the procedure well. Follow up in 4 to 6 weeks   Dinorah Goff verbalized understanding and questions were answered to the best of my knowledge and ability. diet educational materials were provided.

## 2018-11-19 NOTE — PATIENT INSTRUCTIONS
[facility]  [phone]      Post Lap Band Adjustment Instructions    Your band is now more restrictive. Some swelling can occur in the band following a fill. Therefore, you should eat :    Full liquids for 12-24 hours  Soft & mushy foods for 2 days  Resume regular food on the 4th day. ** All meals should fit in a 4 ounce cup. (1/2 cup container) **  Choose foods that require chewing, ideally foods rich in fiber & protein. Avoid fatty & sugary foods. Avoid snack food items. Eating tips:    Put down your fork between bites. Do not talk and eat at the same time. No drinking 30 minutes before or one hour after a meal.    ** Call for an evaluation if you develop new reflux (heartburn), a night time cough or inability to tolerate solids foods. **    Your next regular follow-up appointment is in: 4 to 6 weeks. Please call the office at [phone] to schedule this appointment. If you have any problems before your scheduled appointment, don't wait. Call the office when you are having the problem. They may need to see you before your scheduled appointment.

## 2018-11-26 ENCOUNTER — OFFICE VISIT (OUTPATIENT)
Dept: CARDIOLOGY CLINIC | Age: 63
End: 2018-11-26

## 2018-11-26 DIAGNOSIS — Z95.0 CARDIAC PACEMAKER IN SITU: Primary | ICD-10-CM

## 2019-01-07 ENCOUNTER — OFFICE VISIT (OUTPATIENT)
Dept: SURGERY | Age: 64
End: 2019-01-07

## 2019-01-07 VITALS
RESPIRATION RATE: 18 BRPM | OXYGEN SATURATION: 98 % | HEART RATE: 70 BPM | HEIGHT: 67 IN | WEIGHT: 203 LBS | DIASTOLIC BLOOD PRESSURE: 76 MMHG | BODY MASS INDEX: 31.86 KG/M2 | TEMPERATURE: 97.7 F | SYSTOLIC BLOOD PRESSURE: 139 MMHG

## 2019-01-07 DIAGNOSIS — E66.9 OBESITY (BMI 30.0-34.9): ICD-10-CM

## 2019-01-07 DIAGNOSIS — Z46.51 ADMISSION FOR ADJUSTMENT OF GASTRIC LAP BAND: Primary | ICD-10-CM

## 2019-01-07 NOTE — PROGRESS NOTES
1. Have you been to the ER, urgent care clinic since your last visit? Hospitalized since your last visit? No    2. Have you seen or consulted any other health care providers outside of the [address] [place] [address] since your last visit? Include any pap smears or colon screening.  No

## 2019-01-24 NOTE — PROGRESS NOTES
Subjective: July Modi is a 61 y.o. male with previous lap band surgery, who is here today needing lap band adjustment because of decreased satiety (pt. able to eat > 4 oz at one meal) and early sense of hunger (within 1-2 hours after eating). Dietary compliance is fair. No reflux, night-time cough, heartburn or regurgitation. Objective:     Visit Vitals  /76 (BP 1 Location: Left arm, BP Patient Position: Sitting)   Pulse 70   Temp 97.7 °F (36.5 °C) (Oral)   Resp 18   Ht 5' 7\" (1.702 m)   Wt 203 lb (92.1 kg)   SpO2 98%   BMI 31.79 kg/m²      Estimated body mass index is 31.79 kg/m² as calculated from the following:    Height as of this encounter: 5' 7\" (1.702 m). Weight as of this encounter: 203 lb (92.1 kg). Wt Readings from Last 3 Encounters:   01/07/19 203 lb (92.1 kg)   11/19/18 208 lb (94.3 kg)   09/05/18 192 lb 8 oz (87.3 kg)     His change in weight since last visit: lost,  5 lbs. A + O x 3  Chest  CTA  COR  RRR  ABD Soft, NT/ND, +BS, port non tender  EXT No edema; ambulating independently  Visit Vitals  /76 (BP 1 Location: Left arm, BP Patient Position: Sitting)   Pulse 70   Temp 97.7 °F (36.5 °C) (Oral)   Resp 18   Ht 5' 7\" (1.702 m)   Wt 203 lb (92.1 kg)   SpO2 98%   BMI 31.79 kg/m²           Assessment/Plan: Morbid Obesity, status post lap-band surgery, needing fill adjustment  Lap Band FiIl Procedure    Verbal consent was obtained. The patient was placed in the standing position. The port area was prepped using sterile technique and a Suh needle was inserted. The port was accessed with ease. Previous Fill Volume:  7.75 cc. Added:  0.2 cc   Patient tolerated the procedure well. Reviewed Red Zone symptoms   Referred to RD   Follow up in 4 to 6 weeks as needed. July Modi verbalized understanding and questions were answered to the best of my knowledge and ability. Diet and activity  educational materials were provided.     16 minutes spent in face to face with Chamberlain Delay > 50% counseling.

## 2019-02-27 ENCOUNTER — OFFICE VISIT (OUTPATIENT)
Dept: CARDIOLOGY CLINIC | Age: 64
End: 2019-02-27

## 2019-02-27 DIAGNOSIS — Z95.0 CARDIAC PACEMAKER IN SITU: Primary | ICD-10-CM

## 2019-06-06 ENCOUNTER — OFFICE VISIT (OUTPATIENT)
Dept: CARDIOLOGY CLINIC | Age: 64
End: 2019-06-06

## 2019-06-06 DIAGNOSIS — Z95.0 CARDIAC PACEMAKER IN SITU: Primary | ICD-10-CM

## 2019-07-08 ENCOUNTER — OFFICE VISIT (OUTPATIENT)
Dept: CARDIOLOGY CLINIC | Age: 64
End: 2019-07-08

## 2019-07-08 DIAGNOSIS — Z95.0 CARDIAC PACEMAKER IN SITU: Primary | ICD-10-CM

## 2019-07-17 ENCOUNTER — TELEPHONE (OUTPATIENT)
Dept: CARDIOLOGY CLINIC | Age: 64
End: 2019-07-17

## 2019-07-17 NOTE — TELEPHONE ENCOUNTER
Verified patient with two types of identifiers. Spoke with patient's wife verified on HIPAA. Gave wife  and model number. Notified wife that we normally complete an MRI form. Wife states they were not given form. Notified wife that I spoke with Dr. Rah Acevedo and he said that should be fine but to please notify them that the A lead is dislodged incase the Radiologist has questions prior to the MRI. Gave wife our fax number so if form is needed we can complete. Patient verbalized understanding and will call with any other questions.

## 2019-07-17 NOTE — TELEPHONE ENCOUNTER
Patients wife states that the patient is having an MRI for his back. Patients wife needs the  and model number for [employer] before they will do the MRI.      Phone: [phone]

## 2019-10-03 ENCOUNTER — OFFICE VISIT (OUTPATIENT)
Dept: CARDIOLOGY CLINIC | Age: 64
End: 2019-10-03

## 2019-10-03 ENCOUNTER — CLINICAL SUPPORT (OUTPATIENT)
Dept: CARDIOLOGY CLINIC | Age: 64
End: 2019-10-03

## 2019-10-03 VITALS
RESPIRATION RATE: 14 BRPM | DIASTOLIC BLOOD PRESSURE: 60 MMHG | SYSTOLIC BLOOD PRESSURE: 100 MMHG | HEIGHT: 67 IN | BODY MASS INDEX: 28.41 KG/M2 | WEIGHT: 181 LBS | HEART RATE: 72 BPM

## 2019-10-03 DIAGNOSIS — Z95.0 CARDIAC PACEMAKER IN SITU: Primary | ICD-10-CM

## 2019-10-03 DIAGNOSIS — I45.2 RBBB (RIGHT BUNDLE BRANCH BLOCK WITH LEFT ANTERIOR FASCICULAR BLOCK): ICD-10-CM

## 2019-10-03 DIAGNOSIS — I44.1 SECOND DEGREE AV BLOCK, MOBITZ TYPE II: ICD-10-CM

## 2019-10-03 DIAGNOSIS — Z94.0 RENAL TRANSPLANT RECIPIENT: ICD-10-CM

## 2019-10-03 NOTE — PROGRESS NOTES
Cardiac Electrophysiology Office Note     Subjective: Lazarus Samuel is a 59 y.o. man who is here today for follow up annualy because Biotronik pacer   It is known no capture of atrial lead as xray had shown A lead dislodged in 2015 and was hanging in RA for sensing  He is s/p total knee replacement with Dr Mota Nurse   He had atrial lead dislodged on xray in Nov 2015 but does not need reposition after VDD mode allowed him to do well    No dizziness, syncope  2014 echo at [facility] with severe LVH and dilated RV     Echo 5/23/2018 LVEF 50-55% mild TR    S/p dual chamber pacemaker (Biotronik Setrox) 10/29/15. He has a history of kidney transplant. He has a right arm fistula. He lost about 157 pounds with diet. He has severe left knee arthritis and wanted to have surgery done. He had shortness of breath on exertion, tired easily. In the past when he went for surgery, it was postponed because of bradycardia. EKG show that he has first degree AV block and bifascicular block. He had a Holter monitor that showed second degree AV block with severe bradycardia. 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       multivitamin (ONE A DAY) tablet Take 1 Tab by mouth daily.  aspirin delayed-release 81 mg tablet Take 81 mg by mouth daily.  amLODIPine (NORVASC) 5 mg tablet TAKE 1 TABLET EVERY DAY 30 Tab 5    ONETOUCH ULTRA TEST strip       ONE TOUCH DELICA 33 gauge misc   1    metFORMIN (GLUCOPHAGE) 500 mg tablet 500 mg two (2) times a day.  cholecalciferol, vitamin D3, 2,000 unit tab Take  by mouth daily.  predniSONE (DELTASONE) 5 mg tablet 5 mg daily.  tacrolimus (PROGRAF) 1 mg capsule Take 2 mg by mouth every twelve (12) hours. Anti rejection drug      b complex-vitamin c-folic acid 5mg (FOLBEE PLUS) Tab tablet Take 1 Tab by mouth daily. No Known Allergies  Past Medical History:   Diagnosis Date    A-V fistula ([facility] [address] 2010    Right    Cancer [facility])     prostate - radiation    Chronic kidney disease     Chronic pain     Diabetes ([facility] [address]     h/o Gastric band     h/o successful Renal transplant 2014    Hypertension     Pacemaker 10/2015     Past Surgical History:   Procedure Laterality Date    COLONOSCOPY N/A 4/25/2017    COLONOSCOPY performed by Gail Nolen MD at [address] HX PACEMAKER  10/29/15    not a defrillator    HX RENAL TRANSPLANT  2-16-14    [facility]    HX RENAL TRANSPLANT Right 2/2014    INS PPM/ICD LED DUAL ONLY  10/29/2015         LAP, PLACE ADJUST GASTR BAND  08/2008    PENILE PUMP      twice - last 2/2017     No family history of pacemaker   Social History     Tobacco Use    Smoking status: Never Smoker    Smokeless tobacco: Never Used   Substance Use Topics    Alcohol use: No        Review of Systems:   Constitutional: Negative for fever, chills, weight loss   HEENT: Negative for nosebleeds, vision changes. Respiratory: Negative for cough, hemoptysis, sputum production, and wheezing. Cardiovascular: Negative for chest pain, palpitations, orthopnea, claudication, leg swelling, syncope, and PND.     Gastrointestinal: Negative for nausea, vomiting, diarrhea, constipation, blood in stool and melena. Genitourinary: Negative for dysuria, and hematuria. Musculoskeletal: Negative for myalgias, arthralgia . Skin: Negative for rash. Heme: Does not bleed or bruise easily. Neurological: Negative for speech change and focal weakness     Objective:     Visit Vitals  /60 (BP 1 Location: Left arm, BP Patient Position: Sitting)   Pulse 72   Resp 14   Ht 5' 7\" (1.702 m)   Wt 181 lb (82.1 kg)   BMI 28.35 kg/m²      Physical Exam:   Constitutional: well-developed and well-nourished. No distress. Head: Normocephalic and atraumatic. Eyes: Pupils are equal, round  Neck: supple. No JVD present. Cardiovascular: Normal rate, regular rhythm and normal heart sounds. Exam reveals no gallop and no friction rub. No murmur heard. Pulmonary/Chest: Effort normal and breath sounds normal. No wheezes. Abdominal: Soft, no tenderness. Musculoskeletal: no edema. right arm old AV fistula  Neurological: alert,oriented. Skin: Skin is warm and dry. Left side pacemaker site healed/ unremarkable,   Psychiatric: normal mood and affect. Behavior is normal. Judgment and thought content normal.          Assessment/Plan:       ICD-10-CM ICD-9-CM    1. Cardiac pacemaker in situ Z95.0 V45.01    2. Renal transplant recipient Z94.0 V42.0    3. 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 Second degree AV block, Mobitz type II I44.1 426.12      reviewed pacemaker check and he is comfortable with keeping the current pacemaker mode and does not need RA lead reposition He needs V pacing 90%  His sinus node allows his rate response and he has no MATHIS     He checks pacer every 3 months remotely and once a year in office    Ani Yates M.D.   Electrophysiology/Cardiology  [facility]  [address]  [place] [address]  [phone]                                        [phone]

## 2019-11-29 ENCOUNTER — OFFICE VISIT (OUTPATIENT)
Dept: SURGERY | Age: 64
End: 2019-11-29

## 2019-11-29 VITALS
OXYGEN SATURATION: 97 % | RESPIRATION RATE: 16 BRPM | SYSTOLIC BLOOD PRESSURE: 143 MMHG | WEIGHT: 175 LBS | DIASTOLIC BLOOD PRESSURE: 84 MMHG | HEIGHT: 67 IN | HEART RATE: 58 BPM | TEMPERATURE: 97.9 F | BODY MASS INDEX: 27.47 KG/M2

## 2019-11-29 DIAGNOSIS — Z46.51 FITTING AND ADJUSTMENT OF GASTRIC LAP BAND: ICD-10-CM

## 2019-11-29 DIAGNOSIS — E66.01 MORBID OBESITY (HCC): Primary | ICD-10-CM

## 2019-11-29 DIAGNOSIS — Z98.84 LAP-BAND SURGERY STATUS: ICD-10-CM

## 2019-11-29 NOTE — PROGRESS NOTES
1. Have you been to the ER, urgent care clinic since your last visit? Hospitalized since your last visit? No    2. Have you seen or consulted any other health care providers outside of the [address] [place] [address] since your last visit? Include any pap smears or colon screening.  No

## 2019-11-29 NOTE — PROGRESS NOTES
Subjective: Oliva Manzo is a 59 y.o. male with previous lap band surgery, who is here today needing lap band adjustment because of he \"feels is band is tight. \" He states that he noticed this after starting physical therapy. Denies nausea or vomiting. Just \"tighter. \"         Objective:     Visit Vitals  /84 (BP 1 Location: Left arm, BP Patient Position: Sitting)   Pulse (!) 58   Temp 97.9 °F (36.6 °C) (Oral)   Resp 16   Ht 5' 7\" (1.702 m)   Wt 175 lb (79.4 kg)   SpO2 97%   BMI 27.41 kg/m²      Estimated body mass index is 27.41 kg/m² as calculated from the following:    Height as of this encounter: 5' 7\" (1.702 m). Weight as of this encounter: 175 lb (79.4 kg). Wt Readings from Last 3 Encounters:   11/29/19 175 lb (79.4 kg)   10/03/19 181 lb (82.1 kg)   01/07/19 203 lb (92.1 kg)     His change in weight since last visit: lost,  18 lbs. Assessment/Plan: Morbid Obesity, status post lap-band surgery, needing fill adjustment  Reviewed behaviors for band compliance. Lap Band FiIl Procedure  Informed consent was obtained. Patient was placed in the standing position. The abdomen was prepped using sterile technique. The port was then accessed with ease. 7.75 ml was documented in the band. 0.1 ml was removed from the band. Total in band 7.65 ml. Patient tolerated the procedure well and without difficulty. Patient then tolerated at least 6 ounces of water without difficulty. Advised patient to stay on soft foods for 2 days then advance. Follow up in 12 weeks. Pt verbalized understanding and questions were answered to the best of my knowledge and ability.

## 2020-01-24 ENCOUNTER — OFFICE VISIT (OUTPATIENT)
Dept: CARDIOLOGY CLINIC | Age: 65
End: 2020-01-24

## 2020-01-24 DIAGNOSIS — Z95.0 CARDIAC PACEMAKER IN SITU: Primary | ICD-10-CM

## 2020-04-30 ENCOUNTER — OFFICE VISIT (OUTPATIENT)
Dept: CARDIOLOGY CLINIC | Age: 65
End: 2020-04-30

## 2020-04-30 DIAGNOSIS — Z95.0 CARDIAC PACEMAKER IN SITU: Primary | ICD-10-CM

## 2020-05-03 ENCOUNTER — DOCUMENTATION ONLY (OUTPATIENT)
Dept: CARDIOLOGY CLINIC | Age: 65
End: 2020-05-03

## 2020-05-03 DIAGNOSIS — Z95.0 CARDIAC PACEMAKER IN SITU: ICD-10-CM

## 2020-05-03 DIAGNOSIS — I51.7 LVH (LEFT VENTRICULAR HYPERTROPHY): Primary | ICD-10-CM

## 2020-05-03 NOTE — PROGRESS NOTES
Pacemaker check showed 18 beat NSVT 193 bpm    He is mainly RV pacing    LVEF 2018 was 51%  Stress test 2014    Recommend him repeat echo for LVEF

## 2020-05-06 ENCOUNTER — TELEPHONE (OUTPATIENT)
Dept: CARDIOLOGY CLINIC | Age: 65
End: 2020-05-06

## 2020-05-06 NOTE — TELEPHONE ENCOUNTER
Attempted to reach patient by telephone. A message was left for return call.      Please reschedule patient's Echo to next available time that is conveinant for patient

## 2020-05-06 NOTE — TELEPHONE ENCOUNTER
Verified patient with two types of identifiers. Patient rescheduled Echo. Patient verbalized understanding and will call with any other questions.       Future Appointments   Date Time Provider Hunter Selma   5/21/2020  3:00 PM ECHO, [address] [facility]   8/10/2020  9:00 AM [facility]   11/12/2020  2:00  [place], [address]   11/12/2020  2:20 PM Ras Soliman  [address]

## 2020-05-06 NOTE — TELEPHONE ENCOUNTER
Patient needs to reschedule echo from 0/37 as this conflicts with another appointment.      Phone: [phone]

## 2020-05-06 NOTE — PROGRESS NOTES
Verified patient with two types of identifiers. Notified patient of device findings and MD recommendations. Scheduled patient for Echo. Patient verbalized understanding and will call with any other questions.       Future Appointments   Date Time Provider Hunter Hahn   5/14/2020  3:00 PM ECHO, [address] [facility]   8/10/2020  9:00 AM [facility]OTE[facility], [facility]   11/12/2020  2:00 PM [address] [place] [address] Cross[place], [facility]   11/12/2020  2:20 PM Vera Flanagan  [address]

## 2020-05-26 ENCOUNTER — TELEPHONE (OUTPATIENT)
Dept: CARDIOLOGY CLINIC | Age: 65
End: 2020-05-26

## 2020-05-26 NOTE — TELEPHONE ENCOUNTER
----- Message from Kristine Kemp MD sent at 5/26/2020  7:19 AM EDT -----  LVEF on echo still stable per Dr Varun Burnette  No change in pacemaker

## 2020-05-28 ENCOUNTER — TELEPHONE (OUTPATIENT)
Dept: CARDIOLOGY CLINIC | Age: 65
End: 2020-05-28

## 2020-05-28 NOTE — TELEPHONE ENCOUNTER
Faxed device information to [facility] at fax number [phone]. Fax confirmation received.

## 2020-08-10 ENCOUNTER — OFFICE VISIT (OUTPATIENT)
Dept: CARDIOLOGY CLINIC | Age: 65
End: 2020-08-10
Payer: MEDICAID

## 2020-08-10 DIAGNOSIS — Z95.0 CARDIAC PACEMAKER IN SITU: Primary | ICD-10-CM

## 2020-08-10 PROCEDURE — 93296 REM INTERROG EVL PM/IDS: CPT | Performed by: INTERNAL MEDICINE

## 2020-08-10 PROCEDURE — 93294 REM INTERROG EVL PM/LDLS PM: CPT | Performed by: INTERNAL MEDICINE

## 2020-11-12 ENCOUNTER — OFFICE VISIT (OUTPATIENT)
Dept: CARDIOLOGY CLINIC | Age: 65
End: 2020-11-12
Payer: MEDICARE

## 2020-11-12 ENCOUNTER — CLINICAL SUPPORT (OUTPATIENT)
Dept: CARDIOLOGY CLINIC | Age: 65
End: 2020-11-12
Payer: MEDICAID

## 2020-11-12 VITALS
SYSTOLIC BLOOD PRESSURE: 108 MMHG | DIASTOLIC BLOOD PRESSURE: 62 MMHG | WEIGHT: 176 LBS | BODY MASS INDEX: 27.62 KG/M2 | HEART RATE: 68 BPM | HEIGHT: 67 IN | RESPIRATION RATE: 16 BRPM

## 2020-11-12 DIAGNOSIS — I45.2 RBBB (RIGHT BUNDLE BRANCH BLOCK WITH LEFT ANTERIOR FASCICULAR BLOCK): ICD-10-CM

## 2020-11-12 DIAGNOSIS — I51.7 LVH (LEFT VENTRICULAR HYPERTROPHY): ICD-10-CM

## 2020-11-12 DIAGNOSIS — Z95.0 CARDIAC PACEMAKER IN SITU: Primary | ICD-10-CM

## 2020-11-12 DIAGNOSIS — I44.1 SECOND DEGREE AV BLOCK, MOBITZ TYPE II: ICD-10-CM

## 2020-11-12 DIAGNOSIS — I47.29 NSVT (NONSUSTAINED VENTRICULAR TACHYCARDIA): ICD-10-CM

## 2020-11-12 PROCEDURE — 99214 OFFICE O/P EST MOD 30 MIN: CPT | Performed by: INTERNAL MEDICINE

## 2020-11-12 PROCEDURE — 93280 PM DEVICE PROGR EVAL DUAL: CPT | Performed by: INTERNAL MEDICINE

## 2020-11-12 NOTE — PROGRESS NOTES
Cardiac Electrophysiology Office Note     Subjective: Claudia Brooks is a 72 y.o. man who presents for follow up, is s/p Biotronik dual chamber pacemaker (DOI 10/29/2015). Device check today shows proper lead & generator function. Generator longevity estimated 7 yrs, 11 mos. RV 96%. Small p wave. Since 08/10/2020, 5 AMS episodes, 4-16 sec in duration (all atrial oversensing or noise). VDD due to known RA lead dislodgement. He had known 18 beat run of NSVT in 04/2020, 193 bpm.  Subsequent echo showed low normal LVEF 50-55% with mild concentric LVH. Last stress test was in 2014. BP well controlled. He denies chest pain, palpitations, SOB, PND, orthopnea, syncope, or edema. No bleeding issues on ASA 81 mg po daily. Previous:  Limited echo (05/21/2020): LVEF 50-55%, mild concentric LVH. Echo (05/23/2018): LVEF 50-55%, no RWMA, mild to mod LVH. RV mildly dilated. Mild GUS. Mild MAC. Very mild TR. Known lack of capture of RA lead. CXR confirmed dislodgement in 2015, was hanging in RA for sensing. No lead revision, as VDD mode allowed him to do well. S/p Biotronik dual chamber pacemaker (DOI 10/29/2015) for 1st & 2nd degree AVB, bifascicular block, & bradycardia. S/p renal transplant. 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  multivitamin (ONE A DAY) tablet Take 1 Tab by mouth daily.       aspirin delayed-release 81 mg tablet Take 81 mg by mouth daily.  amLODIPine (NORVASC) 5 mg tablet TAKE 1 TABLET EVERY DAY 30 Tab 5    ONE TOUCH DELICA 33 gauge misc   1    metFORMIN (GLUCOPHAGE) 500 mg tablet 500 mg two (2) times a day.  cholecalciferol, vitamin D3, 2,000 unit tab Take  by mouth daily.  predniSONE (DELTASONE) 5 mg tablet 5 mg daily.  tacrolimus (PROGRAF) 1 mg capsule Take 2 mg by mouth every twelve (12) hours. Anti rejection drug      b complex-vitamin c-folic acid 5mg (FOLBEE PLUS) Tab tablet Take 1 Tab by mouth daily.  ONETOUCH ULTRA TEST strip        No Known Allergies  Past Medical History:   Diagnosis Date    A-V fistula ([facility] [address] 2010    Right    [facility])     prostate - radiation    Chronic kidney disease     Chronic pain     Diabetes ([facility] [address]     h/o Gastric band     h/o successful Renal transplant 2014    Hypertension     Pacemaker 10/2015     Past Surgical History:   Procedure Laterality Date    COLONOSCOPY N/A 4/25/2017    COLONOSCOPY performed by Olga Lidia Peoples MD at [address] HX PACEMAKER  10/29/15    not a defrillator    HX RENAL TRANSPLANT  2-16-14    [facility]    HX RENAL TRANSPLANT Right 2/2014    INS PPM/ICD LED DUAL ONLY  10/29/2015         LAP, PLACE ADJUST GASTR BAND  08/2008    PENILE PUMP      twice - last 2/2017     No family history of pacemaker   Social History     Tobacco Use    Smoking status: Never Smoker    Smokeless tobacco: Never Used   Substance Use Topics    Alcohol use: No        Review of Systems: All other review of systems otherwise negative. Constitutional: Negative for fever, chills, weight loss   HEENT: Negative for nosebleeds, vision changes. Respiratory: Negative for cough, hemoptysis, sputum production, and wheezing. Cardiovascular: Negative for chest pain, palpitations, orthopnea, claudication, leg swelling, syncope, and PND.     Gastrointestinal: Negative for nausea, vomiting, diarrhea, constipation, blood in stool and melena. Genitourinary: Negative for dysuria, and hematuria. Musculoskeletal: Negative for myalgias, arthralgia . Skin: Negative for rash. Heme: Does not bleed or bruise easily. Neurological: Negative for speech change and focal weakness     Objective:     Visit Vitals  /62   Pulse 68   Resp 16   Ht 5' 7\" (1.702 m)   Wt 176 lb (79.8 kg)   BMI 27.57 kg/m²      Physical Exam:   Constitutional: Well-developed and well-nourished. No distress. Head: Normocephalic and atraumatic. Eyes: Pupils are equal, round. Neck: Supple. No JVD present. Cardiovascular: Normal rate, regular rhythm and normal heart sounds. Exam reveals no gallop and no friction rub. 2/6 systolic murmur. Pulmonary/Chest: Effort normal and breath sounds normal. No wheezes. Abdominal: Soft, no tenderness. Musculoskeletal: Moves extremities independently. Normal gait. Vasc/lymphatic: No edema. right arm old AV fistula. Neurological: Alert,oriented. Skin: Skin is warm and dry. Left side pacemaker site healed/unremarkable. Psychiatric: Normal mood and affect. Behavior is normal. Judgment and thought content normal.          Assessment/Plan:       ICD-10-CM ICD-9-CM    1. Cardiac pacemaker in situ  Z95.0 V45.01    2. RBBB (right bundle branch block with left anterior fascicular block)  I45.2 426.52    3. Second degree AV block, Mobitz type II  I44.1 426.12    4. LVH (left ventricular hypertrophy)  I51.7 429.3    5. NSVT (nonsustained ventricular tachycardia) ([facility])  I47.2 427.1       Biotronik dual chamber pacemaker (DOI 10/29/2015) check today shows proper lead & generator function. Generator longevity estimated 7 yrs, 11 mos. RV 96%. Small p wave. Since 08/10/2020, 5 AMS episodes, 4-16 sec in duration (all atrial oversensing or noise). VDD due to known RA lead dislodgement. He did have prior 18 beat run NSVT in 04/2020. He was asymptomatic  Last echo in 05/2020 showed low normal LVEF. Last stress test in 2014. No CHF, will continue to monitor for further arrhythmia via pacemaker and check echo periodically and if LVEF is down further will recommend biv pacer upgrade with RA lead extraction and reimplant  He agrees    BP slightly low but he is not dizzy    No bleeding issues on ASA 81 mg po daily. Remote pacer checks q 3 months. EP clinic follow up in 1 year. Future Appointments   Date Time Provider Hunter Hahn   3/10/2021  1:00 PM REMOTE1, ELSY OLIVA AMB   5/12/2021 11:30 AM REMOTE1, ELSY OLIVA AMB   8/16/2021  9:00 AM REMOTE1, ELSY OLIVA AMB   11/23/2021  9:00 AM PACEMAKER3, ELSY YATES  AMB   11/23/2021  9:20 AM MD Shakeel Harrell M.D.   Electrophysiology/Cardiology  [facility]  [address] [place] [address] 91  [address] [facility], [address] [place] [address]  [phone]

## 2021-03-10 ENCOUNTER — OFFICE VISIT (OUTPATIENT)
Dept: CARDIOLOGY CLINIC | Age: 66
End: 2021-03-10
Payer: MEDICARE

## 2021-03-10 DIAGNOSIS — Z95.0 CARDIAC PACEMAKER IN SITU: Primary | ICD-10-CM

## 2021-03-10 PROCEDURE — 93296 REM INTERROG EVL PM/IDS: CPT | Performed by: INTERNAL MEDICINE

## 2021-03-10 PROCEDURE — 93294 REM INTERROG EVL PM/LDLS PM: CPT | Performed by: INTERNAL MEDICINE

## 2021-06-14 ENCOUNTER — OFFICE VISIT (OUTPATIENT)
Dept: CARDIOLOGY CLINIC | Age: 66
End: 2021-06-14
Payer: MEDICARE

## 2021-06-14 DIAGNOSIS — Z95.0 CARDIAC PACEMAKER IN SITU: Primary | ICD-10-CM

## 2021-06-14 PROCEDURE — 93294 REM INTERROG EVL PM/LDLS PM: CPT | Performed by: INTERNAL MEDICINE

## 2021-06-14 PROCEDURE — 93296 REM INTERROG EVL PM/IDS: CPT | Performed by: INTERNAL MEDICINE

## 2021-06-14 NOTE — LETTER
6/14/2021 8:46 AM 
 
Mr. Doug Posada [address] [address] After careful review of your remote check of your implated device on 3-66-17. I have concluded that your device is working properly. Your next: Automatic remote check ( from home) is scheduled for  9-15-21 If you have any questions, please call [facility] at [phone]. Additional Comments: ________________________________________________ 
 
__________________________________________________________________ 
 
__________________________________________________________________ Sincerely, Gladys Cueva MD [facility]

## 2021-06-14 NOTE — LETTER
2021 8:47 AM 
 
Mr. Qian Hendrix [address] [address] Dear Patient, This letter is to inform you that as of  [facility] will no longer be sending out confirmation letters for remote transmissions through the [employer]. All letters in the future will be posted in an electronic medical records format therefore we highly encourage enrollment in [employer] patient's portal. Once enrolled you will have access to any letters we send as well as appointment information that can be found in your medical record. Our EP team would contact you via phone if there are significant abnormal findings so you can discuss with Dr. Efrain Euceda further in office. Missed transmission letters will also be digitized in [employer] but we will continue to send those out through the mail as well. How to register for [employer]: - Visit [employer] - Click Create an Account - Provide your name, address, and  
- You will then be asked a short series of questions to verify your identity. This helps to ensure that no one else can access your information.  
- If you have any further questions, please contact our office at [phone]. If you choose not to enroll in [employer] or do not have internet access, please kindly let device clinic know and we will accommodate your preference. We are grateful for your understanding and looking forward to this new, improved and more efficient way of communication. Warm Regards, [facility] Staff

## 2021-09-15 ENCOUNTER — OFFICE VISIT (OUTPATIENT)
Dept: CARDIOLOGY CLINIC | Age: 66
End: 2021-09-15
Payer: MEDICARE

## 2021-09-15 DIAGNOSIS — Z95.0 CARDIAC PACEMAKER IN SITU: Primary | ICD-10-CM

## 2021-09-15 PROCEDURE — 93296 REM INTERROG EVL PM/IDS: CPT | Performed by: INTERNAL MEDICINE

## 2021-09-15 NOTE — LETTER
9/15/2021 9:53 AM    Mr. Sherin [address]  [address] [place] [address]            This letter confirms that we have received your scheduled remote check of your implanted     device on 9-15-21  . Our EP team will contact you via phone if there are significant abnormal    findings. Your next in-clinic device check is scheduled for 11-23-21 at 9:00am  .                   If you have any questions, please call [address] [place] [address] Drive at [phone].                Sincerely,    Kylee Beckwith MD [facility]

## 2021-11-23 ENCOUNTER — OFFICE VISIT (OUTPATIENT)
Dept: CARDIOLOGY CLINIC | Age: 66
End: 2021-11-23
Payer: MEDICARE

## 2021-11-23 ENCOUNTER — CLINICAL SUPPORT (OUTPATIENT)
Dept: CARDIOLOGY CLINIC | Age: 66
End: 2021-11-23
Payer: MEDICARE

## 2021-11-23 VITALS
HEART RATE: 67 BPM | SYSTOLIC BLOOD PRESSURE: 138 MMHG | DIASTOLIC BLOOD PRESSURE: 70 MMHG | RESPIRATION RATE: 16 BRPM | OXYGEN SATURATION: 99 % | BODY MASS INDEX: 28.56 KG/M2 | WEIGHT: 182 LBS | HEIGHT: 67 IN

## 2021-11-23 DIAGNOSIS — I44.1 SECOND DEGREE AV BLOCK, MOBITZ TYPE II: ICD-10-CM

## 2021-11-23 DIAGNOSIS — Z95.0 CARDIAC PACEMAKER IN SITU: Primary | ICD-10-CM

## 2021-11-23 DIAGNOSIS — I51.7 LVH (LEFT VENTRICULAR HYPERTROPHY): ICD-10-CM

## 2021-11-23 PROCEDURE — 1101F PT FALLS ASSESS-DOCD LE1/YR: CPT | Performed by: INTERNAL MEDICINE

## 2021-11-23 PROCEDURE — G8536 NO DOC ELDER MAL SCRN: HCPCS | Performed by: INTERNAL MEDICINE

## 2021-11-23 PROCEDURE — G0463 HOSPITAL OUTPT CLINIC VISIT: HCPCS | Performed by: INTERNAL MEDICINE

## 2021-11-23 PROCEDURE — 99214 OFFICE O/P EST MOD 30 MIN: CPT | Performed by: INTERNAL MEDICINE

## 2021-11-23 PROCEDURE — G8419 CALC BMI OUT NRM PARAM NOF/U: HCPCS | Performed by: INTERNAL MEDICINE

## 2021-11-23 PROCEDURE — G8427 DOCREV CUR MEDS BY ELIG CLIN: HCPCS | Performed by: INTERNAL MEDICINE

## 2021-11-23 PROCEDURE — 3017F COLORECTAL CA SCREEN DOC REV: CPT | Performed by: INTERNAL MEDICINE

## 2021-11-23 PROCEDURE — G8432 DEP SCR NOT DOC, RNG: HCPCS | Performed by: INTERNAL MEDICINE

## 2021-11-23 PROCEDURE — 93280 PM DEVICE PROGR EVAL DUAL: CPT | Performed by: INTERNAL MEDICINE

## 2021-11-23 RX ORDER — ACETAMINOPHEN 500 MG
TABLET ORAL
COMMUNITY

## 2021-11-23 RX ORDER — ATORVASTATIN CALCIUM 10 MG/1
TABLET, FILM COATED ORAL
COMMUNITY
Start: 2021-11-01

## 2021-11-23 RX ORDER — MYCOPHENOLATE MOFETIL 250 MG/1
CAPSULE ORAL
COMMUNITY

## 2021-11-23 RX ORDER — BLOOD SUGAR DIAGNOSTIC
STRIP MISCELLANEOUS
Status: ON HOLD | COMMUNITY
Start: 2021-11-08 | End: 2022-08-30 | Stop reason: CLARIF

## 2021-11-23 NOTE — PROGRESS NOTES
Cardiac Electrophysiology Office Note     Subjective: Cheryle Ndiaye is a 77 y.o. man who presents for follow up, is s/p Biotronik dual chamber pacemaker (DOI 10/29/2015). Device check today shows proper lead & generator function. Generator longevity estimated 7 yrs RV 96%. Small p wave. VDD due to known RA lead dislodgement. He had known 18 beat run of NSVT in 04/2020, 193 bpm.  Subsequent echo showed 2020 LVEF 50-55% with mild concentric LVH. Last stress test was in 2014. BP well controlled. He denies chest pain, palpitations, SOB, PND, orthopnea, syncope, or edema. He is still working and has walked often with intentional weight loss  No bleeding issues on ASA 81 mg po daily. Previous:  Limited echo (05/21/2020): LVEF 50-55%, mild concentric LVH. Echo (05/23/2018): LVEF 50-55%, no RWMA, mild to mod LVH. RV mildly dilated. Mild GUS. Mild MAC. Very mild TR. Known lack of capture of RA lead. CXR confirmed dislodgement in 2015, was hanging in RA for sensing. No lead revision, as VDD mode allowed him to do well. S/p Biotronik dual chamber pacemaker (DOI 10/29/2015) for 1st & 2nd degree AVB, bifascicular block, & bradycardia. S/p renal transplant.          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  multivitamin (ONE A DAY) tablet Take 1 Tab by mouth daily.  aspirin delayed-release 81 mg tablet Take 81 mg by mouth daily.  amLODIPine (NORVASC) 5 mg tablet TAKE 1 TABLET EVERY DAY 30 Tab 5    ONE TOUCH DELICA 33 gauge misc   1    metFORMIN (GLUCOPHAGE) 500 mg tablet 500 mg two (2) times a day.  cholecalciferol, vitamin D3, 2,000 unit tab Take  by mouth daily.  predniSONE (DELTASONE) 5 mg tablet 5 mg daily.  tacrolimus (PROGRAF) 1 mg capsule Take 2 mg by mouth every twelve (12) hours. Anti rejection drug      b complex-vitamin c-folic acid 5mg (FOLBEE PLUS) Tab tablet Take 1 Tab by mouth daily.  mycophenolate mofetil (CELLCEPT) 250 mg capsule        No Known Allergies  Past Medical History:   Diagnosis Date    A-V fistula ([facility] [address] 2010    Right    Cancer [facility])     prostate - radiation    Chronic kidney disease     Chronic pain     Diabetes ([facility] [address]     h/o Gastric band     h/o successful Renal transplant 2014    Hypertension     Pacemaker 10/2015     Past Surgical History:   Procedure Laterality Date    COLONOSCOPY N/A 4/25/2017    COLONOSCOPY performed by Hellen Main MD at [address] HX PACEMAKER  10/29/15    not a defrillator    HX RENAL TRANSPLANT  2-16-14    MCV    HX RENAL TRANSPLANT Right 2/2014    INS PPM/ICD LED DUAL ONLY  10/29/2015         LAP, PLACE ADJUST GASTR BAND  08/2008    PENILE PUMP      twice - last 2/2017     No family history of pacemaker   Social History     Tobacco Use    Smoking status: Never Smoker    Smokeless tobacco: Never Used   Substance Use Topics    Alcohol use: No        Review of Systems: All other review of systems otherwise negative. Constitutional: Negative for fever, chills, weight loss   HEENT: Negative for nosebleeds, vision changes. Respiratory: Negative for cough, hemoptysis, sputum production, and wheezing.     Cardiovascular: Negative for chest pain, palpitations, orthopnea, claudication, leg swelling, syncope, and PND. Gastrointestinal: Negative for nausea, vomiting, diarrhea, constipation, blood in stool and melena. Genitourinary: Negative for dysuria, and hematuria. Musculoskeletal: Negative for myalgias, arthralgia . Skin: Negative for rash. Heme: Does not bleed or bruise easily. Neurological: Negative for speech change and focal weakness     Objective:     Visit Vitals  /70   Pulse 67   Resp 16   Ht 5' 7\" (1.702 m)   Wt 182 lb (82.6 kg)   SpO2 99%   BMI 28.51 kg/m²      Physical Exam:   Constitutional: Well-developed and well-nourished. No distress. Head: Normocephalic and atraumatic. Eyes: Pupils are equal, round. Neck: Supple. No JVD present. Cardiovascular: Normal rate, regular rhythm and normal heart sounds. Exam reveals no gallop and no friction rub. 2/6 systolic murmur. Pulmonary/Chest: Effort normal and breath sounds normal. No wheezes. Abdominal: Soft, no tenderness. Musculoskeletal: Moves extremities independently. Normal gait. Vasc/lymphatic: No edema. right arm old AV fistula. Neurological: Alert,oriented. Skin: Skin is warm and dry. Left side pacemaker site healed/unremarkable. Psychiatric: Normal mood and affect. Behavior is normal. Judgment and thought content normal.          Assessment/Plan:       ICD-10-CM ICD-9-CM    1. Cardiac pacemaker in situ  Z95.0 V45.01    2. Second degree AV block, Mobitz type II  I44.1 426.12    3. LVH (left ventricular hypertrophy)  I51.7 429.3       Biotronik dual chamber pacemaker (DOI 10/29/2015) check today shows proper lead & generator function. Generator longevity estimated 7 yrs  Occasional PAT but no NSVT  He is asymptomatic  Last echo in 05/2020 showed 50-55% LVEF. Last stress test in 2014.     No CHF, will continue to monitor for further arrhythmia via pacemaker and check echo periodically and if LVEF is down further will recommend biv pacer upgrade with RA lead extraction and reimplant  He agrees    BP normal and no more dizziness     Remote pacer checks q 3 months. EP clinic follow up in 1 year. Future Appointments   Date Time Provider Hunter Hahn   3/2/2022 11:00 AM REMOTE1, ELSY OLIVA [facility]   6/6/2022  8:45 AM REMOTE1, ELSY OLIVA [facility]   9/7/2022  9:30 AM REMOTE1, ELSY YATES [facility]   12/15/2022  9:00 AM PACEMAKER3, ELSY YATES [facility]   12/15/2022  9:20 AM MD Annmarie Carlin M.D.   Electrophysiology/Cardiology  [facility]  [address] [place] [address]  [phone]

## 2022-03-02 ENCOUNTER — OFFICE VISIT (OUTPATIENT)
Dept: CARDIOLOGY CLINIC | Age: 67
End: 2022-03-02
Payer: MEDICARE

## 2022-03-02 DIAGNOSIS — Z95.0 CARDIAC PACEMAKER IN SITU: Primary | ICD-10-CM

## 2022-03-02 PROCEDURE — 93296 REM INTERROG EVL PM/IDS: CPT | Performed by: INTERNAL MEDICINE

## 2022-03-02 NOTE — LETTER
3/2/2022 10:14 AM    Mr. Roni Avilez  [address] [facility] [address] [id number]          This letter confirms that we have received your scheduled remote check of your implanted     device on 3-2-22  . Our EP team will contact you via phone if there are significant abnormal    findings. Your next remote check from home is scheduled for 6-6-22  . If you have any questions, please call [address] [place] [address] at [phone].                Sincerely,    Adalid Henry MD [facility]

## 2022-03-09 ENCOUNTER — TELEPHONE (OUTPATIENT)
Dept: CARDIOLOGY CLINIC | Age: 67
End: 2022-03-09

## 2022-03-09 NOTE — TELEPHONE ENCOUNTER
Faxed device management form to [address] [place] [address] at fax number [phone]. Fax confirmation received.

## 2022-03-19 PROBLEM — S31.20XA: Status: ACTIVE | Noted: 2017-05-01

## 2022-05-17 ENCOUNTER — APPOINTMENT (OUTPATIENT)
Dept: CT IMAGING | Age: 67
End: 2022-05-17
Attending: EMERGENCY MEDICINE
Payer: MEDICARE

## 2022-05-17 ENCOUNTER — HOSPITAL ENCOUNTER (EMERGENCY)
Age: 67
Discharge: HOME | End: 2022-05-17
Attending: EMERGENCY MEDICINE
Payer: MEDICARE

## 2022-05-17 VITALS
HEIGHT: 67 IN | WEIGHT: 172.18 LBS | RESPIRATION RATE: 16 BRPM | SYSTOLIC BLOOD PRESSURE: 130 MMHG | TEMPERATURE: 98.3 F | DIASTOLIC BLOOD PRESSURE: 83 MMHG | OXYGEN SATURATION: 100 % | HEART RATE: 74 BPM | BODY MASS INDEX: 27.02 KG/M2

## 2022-05-17 DIAGNOSIS — N30.90 CYSTITIS: Primary | ICD-10-CM

## 2022-05-17 LAB
ALBUMIN SERPL-MCNC: 3.9 G/DL (ref 3.5–5)
ALBUMIN/GLOB SERPL: 1.1 {RATIO} (ref 1.1–2.2)
ALP SERPL-CCNC: 71 U/L (ref 45–117)
ALT SERPL-CCNC: 10 U/L (ref 12–78)
ANION GAP SERPL CALC-SCNC: 7 MMOL/L (ref 5–15)
APPEARANCE UR: CLEAR
AST SERPL-CCNC: 10 U/L (ref 15–37)
BACTERIA URNS QL MICRO: NEGATIVE /HPF
BASOPHILS # BLD: 0 K/UL (ref 0–0.1)
BASOPHILS NFR BLD: 0 % (ref 0–1)
BILIRUB SERPL-MCNC: 1.9 MG/DL (ref 0.2–1)
BILIRUB UR QL CFM: NEGATIVE
BUN SERPL-MCNC: 20 MG/DL (ref 6–20)
BUN/CREAT SERPL: 18 (ref 12–20)
CALCIUM SERPL-MCNC: 10 MG/DL (ref 8.5–10.1)
CHLORIDE SERPL-SCNC: 100 MMOL/L (ref 97–108)
CO2 SERPL-SCNC: 28 MMOL/L (ref 21–32)
COLOR UR: ABNORMAL
CREAT SERPL-MCNC: 1.1 MG/DL (ref 0.7–1.3)
DIFFERENTIAL METHOD BLD: ABNORMAL
EOSINOPHIL # BLD: 0 K/UL (ref 0–0.4)
EOSINOPHIL NFR BLD: 0 % (ref 0–7)
EPITH CASTS URNS QL MICRO: ABNORMAL /LPF
ERYTHROCYTE [DISTWIDTH] IN BLOOD BY AUTOMATED COUNT: 14.6 % (ref 11.5–14.5)
GLOBULIN SER CALC-MCNC: 3.7 G/DL (ref 2–4)
GLUCOSE SERPL-MCNC: 82 MG/DL (ref 65–100)
GLUCOSE UR STRIP.AUTO-MCNC: NEGATIVE MG/DL
HCT VFR BLD AUTO: 47.6 % (ref 36.6–50.3)
HGB BLD-MCNC: 15 G/DL (ref 12.1–17)
HGB UR QL STRIP: NEGATIVE
IMM GRANULOCYTES # BLD AUTO: 0.1 K/UL (ref 0–0.04)
IMM GRANULOCYTES NFR BLD AUTO: 0 % (ref 0–0.5)
KETONES UR QL STRIP.AUTO: 40 MG/DL
LEUKOCYTE ESTERASE UR QL STRIP.AUTO: ABNORMAL
LYMPHOCYTES # BLD: 0.8 K/UL (ref 0.8–3.5)
LYMPHOCYTES NFR BLD: 7 % (ref 12–49)
MCH RBC QN AUTO: 27.5 PG (ref 26–34)
MCHC RBC AUTO-ENTMCNC: 31.5 G/DL (ref 30–36.5)
MCV RBC AUTO: 87.2 FL (ref 80–99)
MONOCYTES # BLD: 0.8 K/UL (ref 0–1)
MONOCYTES NFR BLD: 7 % (ref 5–13)
NEUTS SEG # BLD: 10.3 K/UL (ref 1.8–8)
NEUTS SEG NFR BLD: 86 % (ref 32–75)
NITRITE UR QL STRIP.AUTO: NEGATIVE
NRBC # BLD: 0 K/UL (ref 0–0.01)
NRBC BLD-RTO: 0 PER 100 WBC
PH UR STRIP: 5 [PH] (ref 5–8)
PLATELET # BLD AUTO: 218 K/UL (ref 150–400)
PMV BLD AUTO: 8.6 FL (ref 8.9–12.9)
POTASSIUM SERPL-SCNC: 4.6 MMOL/L (ref 3.5–5.1)
PROT SERPL-MCNC: 7.6 G/DL (ref 6.4–8.2)
PROT UR STRIP-MCNC: 30 MG/DL
RBC # BLD AUTO: 5.46 M/UL (ref 4.1–5.7)
RBC #/AREA URNS HPF: ABNORMAL /HPF (ref 0–5)
SODIUM SERPL-SCNC: 135 MMOL/L (ref 136–145)
SP GR UR REFRACTOMETRY: 1.02
UA: UC IF INDICATED,UAUC: ABNORMAL
UROBILINOGEN UR QL STRIP.AUTO: 1 EU/DL (ref 0.2–1)
WBC # BLD AUTO: 12.1 K/UL (ref 4.1–11.1)
WBC URNS QL MICRO: ABNORMAL /HPF (ref 0–4)

## 2022-05-17 PROCEDURE — 80053 COMPREHEN METABOLIC PANEL: CPT

## 2022-05-17 PROCEDURE — 74176 CT ABD & PELVIS W/O CONTRAST: CPT

## 2022-05-17 PROCEDURE — 74011250636 HC RX REV CODE- 250/636: Performed by: EMERGENCY MEDICINE

## 2022-05-17 PROCEDURE — 85025 COMPLETE CBC W/AUTO DIFF WBC: CPT

## 2022-05-17 PROCEDURE — 99284 EMERGENCY DEPT VISIT MOD MDM: CPT

## 2022-05-17 PROCEDURE — 36415 COLL VENOUS BLD VENIPUNCTURE: CPT

## 2022-05-17 PROCEDURE — 87086 URINE CULTURE/COLONY COUNT: CPT

## 2022-05-17 PROCEDURE — 81001 URINALYSIS AUTO W/SCOPE: CPT

## 2022-05-17 PROCEDURE — 96365 THER/PROPH/DIAG IV INF INIT: CPT

## 2022-05-17 PROCEDURE — 74011000258 HC RX REV CODE- 258: Performed by: EMERGENCY MEDICINE

## 2022-05-17 RX ORDER — CEFDINIR 300 MG/1
300 CAPSULE ORAL 2 TIMES DAILY
Qty: 20 CAPSULE | Refills: 0 | Status: SHIPPED | OUTPATIENT
Start: 2022-05-17 | End: 2022-05-27

## 2022-05-17 RX ADMIN — CEFTRIAXONE 1 G: 1 INJECTION, POWDER, FOR SOLUTION INTRAMUSCULAR; INTRAVENOUS at 12:09

## 2022-05-17 NOTE — Clinical Note
[address]  [facility] EMERGENCY DEPT  [address] [place] [address] [phone]    Work/School Note    Date: 5/17/2022    To Whom It May concern: Micah Oro was seen and treated today in the emergency room by the following provider(s):  Attending Provider: Pepe Lake MD.      Micah Oro is excused from work/school on 05/17/22 and 05/18/22. He is medically clear to return to work/school on 5/19/2022.        Sincerely,          Hong Monroe MD

## 2022-05-17 NOTE — ED PROVIDER NOTES
EMERGENCY DEPARTMENT HISTORY AND PHYSICAL EXAM      Date: 5/17/2022  Patient Name: Judy Bateman    History of Presenting Illness     Chief Complaint   Patient presents with    Urinary Frequency     Pt ambulatory into triage with a cc of urinary frequency and pain x 2 days       History Provided By: Patient    HPI: Judy Bateman, 77 y.o. male with PMHx as noted below presents the emergency department with chief complaint of dysuria, urinary frequency and flank pain. Patient noted onset of symptoms yesterday. He describes a constant, moderate burning and frequency. Also reported some right sided flank pain that is mild, dull and aching. Pain is nonradiating. Denies any fevers or systemic symptoms. Pt denies any other alleviating or exacerbating factors. Additionally, pt specifically denies any recent fever, chills, headache, nausea, vomiting, abdominal pain, CP, SOB, lightheadedness, dizziness, numbness, weakness, BLE swelling, heart palpitations, , diarrhea, constipation, melena, hematochezia, cough, or congestion. PCP: Sienna Richard MD    Current Outpatient Medications   Medication Sig Dispense Refill    cefdinir (OMNICEF) 300 mg capsule Take 1 Capsule by mouth two (2) times a day for 10 days. 20 Capsule 0    atorvastatin (LIPITOR) 10 mg tablet       OneTouch Ultra Test strip       mycophenolate mofetil (CELLCEPT) 250 mg capsule       cholecalciferol (VITAMIN D3) (2,000 UNITS /50 MCG) cap capsule 1 capsule      tamsulosin (FLOMAX) 0.4 mg capsule Take 0.4 mg by mouth daily.  multivitamin (ONE A DAY) tablet Take 1 Tab by mouth daily.  aspirin delayed-release 81 mg tablet Take 81 mg by mouth daily.  amLODIPine (NORVASC) 5 mg tablet TAKE 1 TABLET EVERY DAY 30 Tab 5    ONE TOUCH DELICA 33 gauge misc   1    metFORMIN (GLUCOPHAGE) 500 mg tablet 500 mg two (2) times a day.  cholecalciferol, vitamin D3, 2,000 unit tab Take  by mouth daily.       predniSONE (DELTASONE) 5 mg tablet 5 mg daily.  tacrolimus (PROGRAF) 1 mg capsule Take 2 mg by mouth every twelve (12) hours. Anti rejection drug      b complex-vitamin c-folic acid 5mg (FOLBEE PLUS) Tab tablet Take 1 Tab by mouth daily. Past History     Past Medical History:  Past Medical History:   Diagnosis Date    A-V fistula ([facility] [address] 2010    Right    Cancer [facility])     prostate - radiation    Chronic kidney disease     Chronic pain     Diabetes ([facility] [address]     h/o Gastric band     h/o successful Renal transplant 2014    Hypertension     Pacemaker 10/2015       Past Surgical History:  Past Surgical History:   Procedure Laterality Date    COLONOSCOPY N/A 4/25/2017    COLONOSCOPY performed by Lele Hester MD at [address] HX PACEMAKER  10/29/15    not a defrillator    HX RENAL TRANSPLANT  2-16-14    MCV    HX RENAL TRANSPLANT Right 2/2014    INS PPM/ICD LED DUAL ONLY  10/29/2015         LAP, PLACE ADJUST GASTR BAND  08/2008    PENILE PUMP      twice - last 2/2017       Family History:  Family History   Problem Relation Age of Onset    Anesth Problems Neg Hx        Social History:  Social History     Tobacco Use    Smoking status: Never Smoker    Smokeless tobacco: Never Used   Substance Use Topics    Alcohol use: No    Drug use: No       Allergies:  No Known Allergies      Review of Systems   Review of Systems  Constitutional: Negative for fever, chills, and fatigue. HENT: Negative for congestion, sore throat, rhinorrhea, sneezing and neck stiffness   Eyes: Negative for discharge and redness. Respiratory: Negative for  shortness of breath, wheezing   Cardiovascular: Negative for chest pain, palpitations   Gastrointestinal: Negative for nausea, vomiting, abdominal pain, constipation, diarrhea and blood in stool. Genitourinary: Positive for dysuria, frequency, flank pain. Negative decreased urine volume, discharge,   Musculoskeletal: Negative for myalgias or joint pain .    Skin: Negative for rash or lesions . Neurological: Negative weakness, light-headedness, numbness and headaches. Physical Exam   Physical Exam    GENERAL: alert and oriented, no acute distress  EYES: PEERL, No injection, discharge or icterus. ENT: Mucous membranes pink and moist.  NECK: Supple  LUNGS: Airway patent. Non-labored respirations. Breath sounds clear with good air entry bilaterally. HEART: Regular rate and rhythm. No peripheral edema  ABDOMEN: Non-distended and non-tender, without guarding or rebound.   SKIN:  warm, dry  MSK/EXTREMITIES: Without swelling, tenderness or deformity, symmetric with normal ROM  NEUROLOGICAL: Alert, oriented      Diagnostic Study Results     Labs -     Recent Results (from the past 12 hour(s))   URINALYSIS W/ REFLEX CULTURE    Collection Time: 05/17/22 11:08 AM    Specimen: Urine    Urine specimen   Result Value Ref Range    Color DARK YELLOW      Appearance CLEAR CLEAR      Specific gravity 1.021      pH (UA) 5.0 5.0 - 8.0      Protein 30 (A) NEG mg/dL    Glucose Negative NEG mg/dL    Ketone 40 (A) NEG mg/dL    Blood Negative NEG      Urobilinogen 1.0 0.2 - 1.0 EU/dL    Nitrites Negative NEG      Leukocyte Esterase SMALL (A) NEG      WBC 20-50 0 - 4 /hpf    RBC 0-5 0 - 5 /hpf    Epithelial cells FEW FEW /lpf    Bacteria Negative NEG /hpf    UA:UC IF INDICATED URINE CULTURE ORDERED (A) CNI     BILIRUBIN, CONFIRM    Collection Time: 05/17/22 11:08 AM   Result Value Ref Range    Bilirubin UA, confirm Negative     CBC WITH AUTOMATED DIFF    Collection Time: 05/17/22 12:02 PM   Result Value Ref Range    WBC 12.1 (H) 4.1 - 11.1 K/uL    RBC 5.46 4.10 - 5.70 M/uL    HGB 15.0 12.1 - 17.0 g/dL    HCT 47.6 36.6 - 50.3 %    MCV 87.2 80.0 - 99.0 FL    MCH 27.5 26.0 - 34.0 PG    MCHC 31.5 30.0 - 36.5 g/dL    RDW 14.6 (H) 11.5 - 14.5 %    PLATELET 269 600 - 312 K/uL    MPV 8.6 (L) 8.9 - 12.9 FL    NRBC 0.0 0  WBC    ABSOLUTE NRBC 0.00 0.00 - 0.01 K/uL    NEUTROPHILS 86 (H) 32 - 75 %    LYMPHOCYTES 7 (L) 12 - 49 %    MONOCYTES 7 5 - 13 %    EOSINOPHILS 0 0 - 7 %    BASOPHILS 0 0 - 1 %    IMMATURE GRANULOCYTES 0 0.0 - 0.5 %    ABS. NEUTROPHILS 10.3 (H) 1.8 - 8.0 K/UL    ABS. LYMPHOCYTES 0.8 0.8 - 3.5 K/UL    ABS. MONOCYTES 0.8 0.0 - 1.0 K/UL    ABS. EOSINOPHILS 0.0 0.0 - 0.4 K/UL    ABS. BASOPHILS 0.0 0.0 - 0.1 K/UL    ABS. IMM. GRANS. 0.1 (H) 0.00 - 0.04 K/UL    DF AUTOMATED     METABOLIC PANEL, COMPREHENSIVE    Collection Time: 05/17/22 12:02 PM   Result Value Ref Range    Sodium 135 (L) 136 - 145 mmol/L    Potassium 4.6 3.5 - 5.1 mmol/L    Chloride 100 97 - 108 mmol/L    CO2 28 21 - 32 mmol/L    Anion gap 7 5 - 15 mmol/L    Glucose 82 65 - 100 mg/dL    BUN 20 6 - 20 MG/DL    Creatinine 1.10 0.70 - 1.30 MG/DL    BUN/Creatinine ratio 18 12 - 20      GFR est AA >60 >60 ml/min/1.73m2    GFR est non-AA >60 >60 ml/min/1.73m2    Calcium 10.0 8.5 - 10.1 MG/DL    Bilirubin, total 1.9 (H) 0.2 - 1.0 MG/DL    ALT (SGPT) 10 (L) 12 - 78 U/L    AST (SGOT) 10 (L) 15 - 37 U/L    Alk. phosphatase 71 45 - 117 U/L    Protein, total 7.6 6.4 - 8.2 g/dL    Albumin 3.9 3.5 - 5.0 g/dL    Globulin 3.7 2.0 - 4.0 g/dL    A-G Ratio 1.1 1.1 - 2.2         Radiologic Studies -   CT ABD PELV WO CONT   Final Result   Right pelvic renal transplant. No hydronephrosis demonstrated. Severe degenerative changes at L4-L5 with severe canal and foraminal stenoses   at L4-5. Additional incidental findings are as described above. CT Results  (Last 48 hours)               05/17/22 1139  CT ABD PELV WO CONT Final result    Impression:  Right pelvic renal transplant. No hydronephrosis demonstrated. Severe degenerative changes at L4-L5 with severe canal and foraminal stenoses   at L4-5. Additional incidental findings are as described above. Narrative:  CLINICAL HISTORY: rt flank pain    INDICATION: rt flank pain   COMPARISON: 2018   CONTRAST:  None.        TECHNIQUE:    Thin axial images were obtained through the abdomen and pelvis. Coronal and   sagittal reformats were generated. Oral contrast was not administered. CT dose   reduction was achieved through use of a standardized protocol tailored for this   examination and automatic exposure control for dose modulation. The absence of intravenous contrast material reduces the sensitivity for   evaluation of visceral organs and vasculature including presence of small mass   lesions, hemodynamically significant stenoses, dissections, mucosal   abnormalities etc.       FINDINGS:    LUNG BASES: Small to moderate hiatal hernia. INCIDENTALLY IMAGED HEART AND MEDIASTINUM: Mild to moderate cardiac contour   enlargement. Pacemaker device. LIVER: No mass or biliary dilatation. GALLBLADDER: Unremarkable. SPLEEN: No mass. PANCREAS: No mass or ductal dilatation. ADRENALS: Unremarkable. KIDNEYS/URETERS: Renal atrophy and laterally. Right pelvic renal transplant   without evidence for hydronephrosis. STOMACH: Small to moderate hiatal hernia. SMALL BOWEL: Nondistended. COLON: Fecal stasis. APPENDIX: Unremarkable. PERITONEUM: No ascites or pneumoperitoneum. RETROPERITONEUM: No mass. Multiple small periaortic and aortocaval nodes. Abundant atherosclerotic calcifications . REPRODUCTIVE ORGANS: Penile implant. URINARY BLADDER: Nondistended. BONES: Spondylolisthesis at L4-5. Severe canal and foraminal stenosis at L4-5. ADDITIONAL COMMENTS: N/A               CXR Results  (Last 48 hours)    None            Medical Decision Making     IBo MD am the first provider for this patient and am the attending of record for this patient encounter. I reviewed the vital signs, available nursing notes, past medical history, past surgical history, family history and social history. Vital Signs-Reviewed the patient's vital signs.   Patient Vitals for the past 12 hrs:   Temp Pulse Resp BP SpO2   05/17/22 0953 98.3 °F (36.8 °C) 74 16 130/83 100 %         Records Reviewed: Nursing Notes and Old Medical Records    Provider Notes (Medical Decision Making): On presentation, the patient is well appearing, in no acute distress with normal vital signs. Based on my history and exam the differential diagnosis for this patient includes cystitis, pyelonephritis, renal stone, KOFI. Basic labs notable for mild leukocytosis. CT imaging showed no acute findings on my interpretation. Urinalysis is consistent with likely urinary tract infection. Culture sent and pending. Patient was given IV Rocephin in the emergency department, will be discharged on cefdinir    ED Course:   Initial assessment performed. The patients presenting problems have been discussed, and they are in agreement with the care plan formulated and outlined with them. I have encouraged them to ask questions as they arise throughout their visit. Medications   cefTRIAXone (ROCEPHIN) 1 g in 0.9% sodium chloride (MBP/ADV) 50 mL MBP (0 g IntraVENous IV Completed 5/17/22 1326)         JEFF Mariscals  results have been reviewed with him. He has been counseled regarding his diagnosis. He verbally conveys understanding and agreement of the signs, symptoms, diagnosis, treatment and prognosis and additionally agrees to follow up as recommended with Dr. Cristopehr Castro MD in 24 - 48 hours. He also agrees with the care-plan and conveys that all of his questions have been answered. I have also put together some discharge instructions for him that include: 1) educational information regarding their diagnosis, 2) how to care for their diagnosis at home, as well a 3) list of reasons why they would want to return to the ED prior to their follow-up appointment, should their condition change. Disposition:  home    PLAN:  1.    Discharge Medication List as of 5/17/2022  1:15 PM      START taking these medications    Details   cefdinir (OMNICEF) 300 mg capsule Take 1 Capsule by mouth two (2) times a day for 10 days. , Normal, Disp-20 Capsule, R-0         CONTINUE these medications which have NOT CHANGED    Details   atorvastatin (LIPITOR) 10 mg tablet Historical Med      OneTouch Ultra Test strip Historical Med, QUINTON      mycophenolate mofetil (CELLCEPT) 250 mg capsule Historical Med      cholecalciferol (VITAMIN D3) (2,000 UNITS /50 MCG) cap capsule 1 capsule, Historical Med      tamsulosin (FLOMAX) 0.4 mg capsule Take 0.4 mg by mouth daily. , Historical Med      multivitamin (ONE A DAY) tablet Take 1 Tab by mouth daily. , Historical Med      aspirin delayed-release 81 mg tablet Take 81 mg by mouth daily. , Historical Med      amLODIPine (NORVASC) 5 mg tablet TAKE 1 TABLET EVERY DAY, Normal, Disp-30 Tab, R-5      ONE TOUCH DELICA 33 gauge misc Historical Med, R-1      metFORMIN (GLUCOPHAGE) 500 mg tablet 500 mg two (2) times a day., Historical Med      cholecalciferol, vitamin D3, 2,000 unit tab Take  by mouth daily. , Historical Med      predniSONE (DELTASONE) 5 mg tablet 5 mg daily. , Historical Med      tacrolimus (PROGRAF) 1 mg capsule Take 2 mg by mouth every twelve (12) hours. Anti rejection drug, Historical Med      b complex-vitamin c-folic acid 5mg (FOLBEE PLUS) Tab tablet Take 1 Tab by mouth daily. , Historical Med           2. Follow-up Information     Follow up With Specialties Details Why Contact Info    Mckenzie Kraft MD Family Medicine Schedule an appointment as soon as possible for a visit in 2 days  [address] [place] [id number] [phone]      [address] [place] [address] DEPT Emergency Medicine  If symptoms worsen [address] [place] [address]  [address] [facility]  [phone]        Return to ED if worse     Diagnosis     Clinical Impression:   1. Cystitis        Please note that this dictation was completed with Dragon, computer voice recognition software.   Quite often unanticipated grammatical, syntax, homophones, and other interpretive errors are inadvertently transcribed by the computer software. Please disregard these errors. Additionally, please excuse any errors that have escaped final proofreading.

## 2022-05-18 LAB
BACTERIA SPEC CULT: NORMAL
SERVICE CMNT-IMP: NORMAL

## 2022-06-20 ENCOUNTER — OFFICE VISIT (OUTPATIENT)
Dept: CARDIOLOGY CLINIC | Age: 67
End: 2022-06-20
Payer: MEDICARE

## 2022-06-20 DIAGNOSIS — Z95.0 CARDIAC PACEMAKER IN SITU: Primary | ICD-10-CM

## 2022-06-20 PROCEDURE — 93296 REM INTERROG EVL PM/IDS: CPT | Performed by: INTERNAL MEDICINE

## 2022-06-22 PROCEDURE — 93294 REM INTERROG EVL PM/LDLS PM: CPT | Performed by: INTERNAL MEDICINE

## 2022-08-30 ENCOUNTER — ANESTHESIA EVENT (OUTPATIENT)
Dept: ENDOSCOPY | Age: 67
End: 2022-08-30
Payer: MEDICARE

## 2022-08-30 ENCOUNTER — HOSPITAL ENCOUNTER (OUTPATIENT)
Age: 67
Setting detail: OUTPATIENT SURGERY
Discharge: HOME | End: 2022-08-30
Attending: SPECIALIST | Admitting: SPECIALIST
Payer: MEDICARE

## 2022-08-30 ENCOUNTER — ANESTHESIA (OUTPATIENT)
Dept: ENDOSCOPY | Age: 67
End: 2022-08-30
Payer: MEDICARE

## 2022-08-30 VITALS
OXYGEN SATURATION: 93 % | HEART RATE: 78 BPM | BODY MASS INDEX: 26.89 KG/M2 | HEIGHT: 67 IN | DIASTOLIC BLOOD PRESSURE: 57 MMHG | WEIGHT: 171.3 LBS | TEMPERATURE: 98 F | SYSTOLIC BLOOD PRESSURE: 126 MMHG | RESPIRATION RATE: 15 BRPM

## 2022-08-30 PROCEDURE — 76040000019: Performed by: SPECIALIST

## 2022-08-30 PROCEDURE — 74011250636 HC RX REV CODE- 250/636: Performed by: SPECIALIST

## 2022-08-30 PROCEDURE — 74011250636 HC RX REV CODE- 250/636: Performed by: NURSE ANESTHETIST, CERTIFIED REGISTERED

## 2022-08-30 PROCEDURE — 88305 TISSUE EXAM BY PATHOLOGIST: CPT

## 2022-08-30 PROCEDURE — 74011250637 HC RX REV CODE- 250/637: Performed by: SPECIALIST

## 2022-08-30 PROCEDURE — 74011000250 HC RX REV CODE- 250: Performed by: NURSE ANESTHETIST, CERTIFIED REGISTERED

## 2022-08-30 PROCEDURE — 76060000031 HC ANESTHESIA FIRST 0.5 HR: Performed by: SPECIALIST

## 2022-08-30 PROCEDURE — 2709999900 HC NON-CHARGEABLE SUPPLY: Performed by: SPECIALIST

## 2022-08-30 PROCEDURE — 77030029384 HC SNR POLYP CAPTVR II BSC -B: Performed by: SPECIALIST

## 2022-08-30 RX ORDER — ATROPINE SULFATE 0.1 MG/ML
0.5 INJECTION INTRAVENOUS
Status: DISCONTINUED | OUTPATIENT
Start: 2022-08-30 | End: 2022-08-30 | Stop reason: HOSPADM

## 2022-08-30 RX ORDER — SODIUM CHLORIDE 9 MG/ML
50 INJECTION, SOLUTION INTRAVENOUS CONTINUOUS
Status: DISCONTINUED | OUTPATIENT
Start: 2022-08-30 | End: 2022-08-30 | Stop reason: HOSPADM

## 2022-08-30 RX ORDER — PROPOFOL 10 MG/ML
INJECTION, EMULSION INTRAVENOUS AS NEEDED
Status: DISCONTINUED | OUTPATIENT
Start: 2022-08-30 | End: 2022-08-30 | Stop reason: HOSPADM

## 2022-08-30 RX ORDER — FLUMAZENIL 0.1 MG/ML
0.2 INJECTION INTRAVENOUS
Status: DISCONTINUED | OUTPATIENT
Start: 2022-08-30 | End: 2022-08-30 | Stop reason: HOSPADM

## 2022-08-30 RX ORDER — EPINEPHRINE 0.1 MG/ML
1 INJECTION INTRACARDIAC; INTRAVENOUS
Status: DISCONTINUED | OUTPATIENT
Start: 2022-08-30 | End: 2022-08-30 | Stop reason: HOSPADM

## 2022-08-30 RX ORDER — SODIUM CHLORIDE 9 MG/ML
INJECTION, SOLUTION INTRAVENOUS
Status: DISCONTINUED | OUTPATIENT
Start: 2022-08-30 | End: 2022-08-30 | Stop reason: HOSPADM

## 2022-08-30 RX ORDER — NALOXONE HYDROCHLORIDE 0.4 MG/ML
0.4 INJECTION, SOLUTION INTRAMUSCULAR; INTRAVENOUS; SUBCUTANEOUS
Status: DISCONTINUED | OUTPATIENT
Start: 2022-08-30 | End: 2022-08-30 | Stop reason: HOSPADM

## 2022-08-30 RX ORDER — LIDOCAINE HYDROCHLORIDE 20 MG/ML
INJECTION, SOLUTION EPIDURAL; INFILTRATION; INTRACAUDAL; PERINEURAL AS NEEDED
Status: DISCONTINUED | OUTPATIENT
Start: 2022-08-30 | End: 2022-08-30 | Stop reason: HOSPADM

## 2022-08-30 RX ORDER — DEXTROMETHORPHAN/PSEUDOEPHED 2.5-7.5/.8
1.2 DROPS ORAL
Status: DISCONTINUED | OUTPATIENT
Start: 2022-08-30 | End: 2022-08-30 | Stop reason: HOSPADM

## 2022-08-30 RX ORDER — SODIUM CHLORIDE 0.9 % (FLUSH) 0.9 %
5-40 SYRINGE (ML) INJECTION EVERY 8 HOURS
Status: DISCONTINUED | OUTPATIENT
Start: 2022-08-30 | End: 2022-08-30 | Stop reason: HOSPADM

## 2022-08-30 RX ORDER — SODIUM CHLORIDE 0.9 % (FLUSH) 0.9 %
5-40 SYRINGE (ML) INJECTION AS NEEDED
Status: DISCONTINUED | OUTPATIENT
Start: 2022-08-30 | End: 2022-08-30 | Stop reason: HOSPADM

## 2022-08-30 RX ADMIN — PROPOFOL 50 MG: 10 INJECTION, EMULSION INTRAVENOUS at 15:18

## 2022-08-30 RX ADMIN — PROPOFOL 50 MG: 10 INJECTION, EMULSION INTRAVENOUS at 15:21

## 2022-08-30 RX ADMIN — PROPOFOL 50 MG: 10 INJECTION, EMULSION INTRAVENOUS at 15:14

## 2022-08-30 RX ADMIN — SODIUM CHLORIDE: 900 INJECTION, SOLUTION INTRAVENOUS at 14:56

## 2022-08-30 RX ADMIN — PROPOFOL 50 MG: 10 INJECTION, EMULSION INTRAVENOUS at 15:12

## 2022-08-30 RX ADMIN — PROPOFOL 50 MG: 10 INJECTION, EMULSION INTRAVENOUS at 15:10

## 2022-08-30 RX ADMIN — LIDOCAINE HYDROCHLORIDE 60 MG: 20 INJECTION, SOLUTION EPIDURAL; INFILTRATION; INTRACAUDAL; PERINEURAL at 15:10

## 2022-08-30 RX ADMIN — PROPOFOL 50 MG: 10 INJECTION, EMULSION INTRAVENOUS at 15:11

## 2022-08-30 NOTE — ANESTHESIA PREPROCEDURE EVALUATION
Anesthetic History   No history of anesthetic complications            Review of Systems / Medical History  Patient summary reviewed, nursing notes reviewed and pertinent labs reviewed    Pulmonary  Within defined limits                 Neuro/Psych         Psychiatric history     Cardiovascular  Within defined limits  Hypertension        Dysrhythmias            GI/Hepatic/Renal  Within defined limits              Endo/Other  Within defined limits  Diabetes         Other Findings   Comments: Renal tx           Physical Exam    Airway  Mallampati: II  TM Distance: > 6 cm  Neck ROM: normal range of motion   Mouth opening: Normal     Cardiovascular  Regular rate and rhythm,  S1 and S2 normal,  no murmur, click, rub, or gallop             Dental  No notable dental hx       Pulmonary  Breath sounds clear to auscultation               Abdominal  GI exam deferred       Other Findings            Anesthetic Plan    ASA: 3  Anesthesia type: MAC          Induction: Intravenous  Anesthetic plan and risks discussed with: Patient

## 2022-08-30 NOTE — PROCEDURES
[address]  [address]                 Colonoscopy Procedure Note    Indications:   See Preoperative Diagnosis above  Referring Physician: Antionette Torrez MD  Anesthesia/Sedation: MAC anesthesia Propofol  Endoscopist:  Dr. Madeleine Chin  Assistant:  * No surgical staff found *  Preoperative diagnosis: PERSONAL HX OF COLONIC POLPS  Postoperative diagnosis: * No post-op diagnosis entered *    Procedure in Detail:  Informed consent was obtained for the procedure, including sedation. Risks of perforation, hemorrhage, adverse drug reaction, and aspiration were discussed. The patient was placed in the left lateral decubitus position. Based on the pre-procedure assessment, including review of the patient's medical history, medications, allergies, and review of systems, he had been deemed to be an appropriate candidate for  sedation; he was therefore sedated with the medications listed above. The patient was monitored continuously with ECG tracing, pulse oximetry, blood pressure monitoring, and direct observations. A rectal examination was performed. The [id number] was inserted into the rectum and advanced under direct vision to the cecum, which was identified by the ileocecal valve and appendiceal orifice. The quality of the colonic preparation was good. A careful inspection was made as the colonoscope was withdrawn, including a retroflexed view of the rectum; findings and interventions are described below. Appropriate photodocumentation was obtained. Findings:  Rectum: 2 mm polyp removed with cold biopsy, 5 mm polyp removed with cold snare  Sigmoid: moderate diverticulosis; Descending Colon: moderate diverticulosis;  Transverse Colon: mild diverticulosis; Ascending Colon: normal  Cecum: normal    Specimens:     Colon polyps    EBL: None    Complications: None; patient tolerated the procedure well. Recommendations:     - Await pathology.     - If adenoma is present, repeat colonoscopy in 5 years. - High fiber diet.       Signed By: Rick Charles MD                        August 30, 2022

## 2022-08-30 NOTE — ANESTHESIA POSTPROCEDURE EVALUATION
Post-Anesthesia Evaluation and Assessment    Patient: Kathrin Pandey MRN: [id number]  SSN: [id number]    YOB: 1955  Age: 79 y.o. Sex: male      I have evaluated the patient and they are stable and ready for discharge from the PACU. Cardiovascular Function/Vital Signs  Visit Vitals  BP (!) 105/44 (BP 1 Location: Left leg)   Pulse 72   Temp 36.7 °C (98 °F)   Resp 16   Ht 5' 7\" (1.702 m)   Wt 77.7 kg (171 lb 4.8 oz)   SpO2 100%   BMI 26.83 kg/m²       Patient is status post MAC anesthesia for Procedure(s):  COLONOSCOPY  ENDOSCOPIC POLYPECTOMY. Nausea/Vomiting: None    Postoperative hydration reviewed and adequate. Pain:  Pain Scale 1: Visual (08/30/22 1538)  Pain Intensity 1: 0 (08/30/22 1538)   Managed    Neurological Status: At baseline    Mental Status, Level of Consciousness: Alert and  oriented to person, place, and time    Pulmonary Status:   O2 Device: CO2 nasal cannula (08/30/22 1532)   Adequate oxygenation and airway patent    Complications related to anesthesia: None    Post-anesthesia assessment completed. No concerns    Signed By: Kari Sorenson MD     August 30, 2022              Procedure(s):  COLONOSCOPY  ENDOSCOPIC POLYPECTOMY. MAC    <BSHSIANPOST>    INITIAL Post-op Vital signs:   Vitals Value Taken Time   /44 08/30/22 1538   Temp 36.7 °C (98 °F) 08/30/22 1538   Pulse 60 08/30/22 1541   Resp 15 08/30/22 1541   SpO2 93 % 08/30/22 1541   Vitals shown include unvalidated device data.

## 2022-08-30 NOTE — H&P
Colonoscopy History and Physical      The patient was seen and examined. Date of last colonoscopy: 2017, Polyps  Yes      The airway was assessed and documented. The problem list, past medical history, and medications were reviewed. 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    First degree AV block I44.0    S/P placement of cardiac pacemaker Z95.0    Second degree AV block, Mobitz type II I44.1    Primary osteoarthritis of left knee M17.12    Open wound of penis with complication E07.98GX     Social History     Socioeconomic History    Marital status:      Spouse name: Not on file    Number of children: Not on file    Years of education: Not on file    Highest education level: Not on file   Occupational History    Not on file   Tobacco Use    Smoking status: Never    Smokeless tobacco: Never   Substance and Sexual Activity    Alcohol use: No    Drug use: No    Sexual activity: Not on file   Other Topics Concern    Not on file   Social History Narrative    Not on file     Social Determinants of Health     Financial Resource Strain: Not on file   Food Insecurity: Not on file   Transportation Needs: Not on file   Physical Activity: Not on file   Stress: Not on file   Social Connections: Not on file   Intimate Partner Violence: Not on file   Housing Stability: Not on file     Past Medical History:   Diagnosis Date    A-V fistula ([facility] 2010    Right    Cancer [facility])     prostate - radiation    Chronic kidney disease     Chronic pain     [facility]     h/o Gastric band     h/o successful Renal transplant 2014    Hypertension     Pacemaker 10/2015         Prior to Admission Medications   Prescriptions Last Dose Informant Patient Reported? Taking?    amLODIPine (NORVASC) 5 mg tablet 8/29/2022  No Yes   Sig: TAKE 1 TABLET EVERY DAY   aspirin delayed-release 81 mg tablet 2022  Yes Yes   Sig: Take 81 mg by mouth daily. atorvastatin (LIPITOR) 10 mg tablet 2022  Yes Yes   b complex-vitamin c-folic acid 5mg (FOLBEE PLUS) tab tablet 2022  Yes Yes   Sig: Take 1 Tab by mouth daily. cholecalciferol (VITAMIN D3) (2,000 UNITS /50 MCG) cap capsule 2022  Yes Yes   Si capsule   metFORMIN (GLUCOPHAGE) 500 mg tablet 2022  Yes Yes   Si mg two (2) times a day. multivitamin (ONE A DAY) tablet 2022  Yes Yes   Sig: Take 1 Tab by mouth daily. mycophenolate mofetil (CELLCEPT) 250 mg capsule 2022  Yes Yes   predniSONE (DELTASONE) 5 mg tablet 2022  Yes Yes   Si mg daily. tacrolimus (PROGRAF) 1 mg capsule 2022  Yes Yes   Sig: Take 2 mg by mouth every twelve (12) hours. Anti rejection drug   tamsulosin (FLOMAX) 0.4 mg capsule 2022  Yes Yes   Sig: Take 0.4 mg by mouth daily. Facility-Administered Medications: None       The patient was seen and examined in the endoscopy suite. The airway was assessed and documented. The problem list and medications were reviewed. Chief complaint, history of present illness, and review of systems and Past medical History are positive for: history of polyps    The heart, lungs and mental status were satisfactory for the administration of sedation and for the procedure. I discussed with the patient the objectives, risks, consequences and alternatives to the procedure. The patient was counseled at length about the risks of milton Covid-19 in the mary-operative and post-operative states including the recovery window of their procedure. The patient was made aware that milton Covid-19 after a surgical procedure may worsen their prognosis for recovering from the virus and lend to a higher morbidity and or mortality risk. The patient was given the options of postponing their procedure.  All of the risks, benefits, and alternatives were discussed. The patient does  wish to proceed with the procedure.     Plan: Endoscopic procedure with sedation     Marie Kelley MD   8/30/2022  2:14 PM

## 2022-08-30 NOTE — PROCEDURES
[address]  [address]                 Colonoscopy Procedure Note    Indications:   See Preoperative Diagnosis above  Referring Physician: Gay Dubose MD  Anesthesia/Sedation: MAC anesthesia Propofol  Endoscopist:  Dr. Noralee Merlin  Assistant:  Endoscopy Technician-1: Ruben Byers  Endoscopy RN-1: Berton Goltz, RN  Preoperative diagnosis: PERSONAL HX OF COLONIC POLPS  Postoperative diagnosis: colon polyp    Procedure in Detail:  Informed consent was obtained for the procedure, including sedation. Risks of perforation, hemorrhage, adverse drug reaction, and aspiration were discussed. The patient was placed in the left lateral decubitus position. Based on the pre-procedure assessment, including review of the patient's medical history, medications, allergies, and review of systems, he had been deemed to be an appropriate candidate for  sedation; he was therefore sedated with the medications listed above. The patient was monitored continuously with ECG tracing, pulse oximetry, blood pressure monitoring, and direct observations. A rectal examination was performed. The [id number] was inserted into the rectum and advanced under direct vision to the cecum, which was identified by the ileocecal valve and appendiceal orifice. The quality of the colonic preparation was adequate. A careful inspection was made as the colonoscope was withdrawn, including a retroflexed view of the rectum; findings and interventions are described below. Appropriate photodocumentation was obtained. Findings:  Rectum: normal  Sigmoid: normal  Descending Colon: normal  Transverse Colon: normal  Ascending Colon: normal  Cecum: Two polyps measuring 3,4 mm were removed with cold snare, prep was less than ideal.    Specimens:     Polyp cecum    EBL: None    Complications: None; patient tolerated the procedure well. Recommendations:     - Await pathology.      - Colon in 1 year with 2 day prep with Mag Citrate and Nulytely        Signed By: Catarino Obando MD                        August 30, 2022

## 2022-08-30 NOTE — DISCHARGE INSTRUCTIONS
Andre Davalos  [id number]  1955    COLON DISCHARGE INSTRUCTIONS  Discomfort:  Redness at IV site- apply warm compress to area; if redness or soreness persist- contact your physician  There may be a slight amount of blood passed from the rectum  Gaseous discomfort- walking, belching will help relieve any discomfort    DIET:   Regular diet. - however -  remember your colon is empty and a heavy meal will produce gas. Avoid these foods:  vegetables, fried / greasy foods, carbonated drinks for today. You may not drink alcoholic beverages for at least 12 hours    MEDICATIONS:   Regarding Aspirin or Nonsteroidal medications, please see below. ACTIVITY:  You may resume your normal daily activities it is recommended that you spend the remainder of the day resting -  avoid any strenuous activity. You may not operate a vehicle for 12 hours  You may not engage in an occupation involving machinery or appliances for rest of today  Avoid making any critical decisions for at least 24 hour    CALL M.D. ANY SIGN OF:  Increasing pain, nausea, vomiting  Abdominal distension (swelling)  New increased bleeding (oral or rectal)  Fever (chills)  Pain in chest area  Bloody discharge from nose or mouth  Shortness of breath    You may not  take any Advil, Aspirin, Ibuprofen, Motrin, Aleve, or Goodys for 10 days, ONLY  Tylenol as needed for pain. Post procedure diagnosis: colon polyp      Follow-up Instructions:   Call Dr. Igor Sanders  Results of procedure / biopsy in 10 days  Telephone #  [phone]      DISCHARGE SUMMARY from Nurse    The following personal items collected during your admission are returned to you:   Dental Appliance: Dental Appliances: None  Vision: Visual Aid: Glasses  Hearing Aid:    Jewelry:    Clothing:    Other Valuables:    Valuables sent to safe:      Learning About Coronavirus (COVID-19)  Coronavirus (COVID-19): Overview  What is coronavirus (JDLJS-02)?   The coronavirus disease (COVID-19) is caused by a virus. It is an illness that was first found in [place], [place], in December 2019. It has since spread worldwide. The virus can cause fever, cough, and trouble breathing. In severe cases, it can cause pneumonia and make it hard to breathe without help. It can cause death. Coronaviruses are a large group of viruses. They cause the common cold. They also cause more serious illnesses like Middle East respiratory syndrome (MERS) and severe acute respiratory syndrome (SARS). COVID-19 is caused by a novel coronavirus. That means it's a new type that has not been seen in people before. This virus spreads person-to-person through droplets from coughing and sneezing. It can also spread when you are close to someone who is infected. And it can spread when you touch something that has the virus on it, such as a doorknob or a tabletop. What can you do to protect yourself from coronavirus (COVID-19)? The best way to protect yourself from getting sick is to: Avoid areas where there is an outbreak. Avoid contact with people who may be infected. Wash your hands often with soap or alcohol-based hand sanitizers. Avoid crowds and try to stay at least 6 feet away from other people. Wash your hands often, especially after you cough or sneeze. Use soap and water, and scrub for at least 20 seconds. If soap and water aren't available, use an alcohol-based hand . Avoid touching your mouth, nose, and eyes. What can you do to avoid spreading the virus to others? To help avoid spreading the virus to others:  Cover your mouth with a tissue when you cough or sneeze. Then throw the tissue in the trash. Use a disinfectant to clean things that you touch often. Stay home if you are sick or have been exposed to the virus. Don't go to school, work, or public areas. And don't use public transportation. If you are sick:  Leave your home only if you need to get medical care.  But call the doctor's office first so they know you're coming. And wear a face mask, if you have one. If you have a face mask, wear it whenever you're around other people. It can help stop the spread of the virus when you cough or sneeze. Clean and disinfect your home every day. Use household  and disinfectant wipes or sprays. Take special care to clean things that you grab with your hands. These include doorknobs, remote controls, phones, and handles on your refrigerator and microwave. And don't forget countertops, tabletops, bathrooms, and computer keyboards. When to call for help  Call 911 anytime you think you may need emergency care. For example, call if:  You have severe trouble breathing. (You can't talk at all.)  You have constant chest pain or pressure. You are severely dizzy or lightheaded. You are confused or can't think clearly. Your face and lips have a blue color. You pass out (lose consciousness) or are very hard to wake up. Call your doctor now if you develop symptoms such as:  Shortness of breath. Fever. Cough. If you need to get care, call ahead to the doctor's office for instructions before you go. Make sure you wear a face mask, if you have one, to prevent exposing other people to the virus. Where can you get the latest information? The following health organizations are tracking and studying this virus. Their websites contain the most up-to-date information. Emeli Franklin also learn what to do if you think you may have been exposed to the virus. U.S. Centers for Disease Control and Prevention (CDC): The CDC provides updated news about the disease and travel advice. The website also tells you how to prevent the spread of infection. www.cdc.gov  World Health Organization [facility]): WHO offers information about the virus outbreaks. WHO also has travel advice. www.who.int  Current as of: April 1, 2020               Content Version: 12.4  © 0268-6444 Healthwise, Incorporated.    Care instructions adapted under license by your healthcare professional. If you have questions about a medical condition or this instruction, always ask your healthcare professional. [place] [address] any warranty or liability for your use of this information.

## 2022-09-28 ENCOUNTER — OFFICE VISIT (OUTPATIENT)
Dept: CARDIOLOGY CLINIC | Age: 67
End: 2022-09-28
Payer: MEDICARE

## 2022-09-28 DIAGNOSIS — Z95.0 CARDIAC PACEMAKER IN SITU: Primary | ICD-10-CM

## 2022-09-28 PROCEDURE — 93296 REM INTERROG EVL PM/IDS: CPT | Performed by: INTERNAL MEDICINE

## 2022-09-28 PROCEDURE — 93294 REM INTERROG EVL PM/LDLS PM: CPT | Performed by: INTERNAL MEDICINE

## 2022-09-28 NOTE — LETTER
9/28/2022 1:50 PM    Mr. Soto Nice  [address] [place] [phone]    Dear Mr. Soto Nice,    We have received your recent remote monitor check of your implanted device on 9/28/22. Your remaining estimated battery life is 55% and your device is working normally & appropriately. Your next remote monitor check is scheduled for 3/22/2023. This is NOT an in-clinic appointment. This transmission is sent from your home monitor. Please make sure your home monitor is plugged into power and within 10 feet of where you sleep. If you are using the phone applications, please make sure it is open on your smart phone. If you have difficulty sending a transmission, please do NOT call our office. Instead, call tech support for your device as they are better able to assist.    Home Monitoring ([employer] ePrint) [phone]    Your next clinic/office check is scheduled for Thursday, 12/15/2022 at 9:00 am.  You will have your device checked then see the provider. Please bring a complete list of your medications with strengths and dosages to this appointment. If you have any questions, please call the Pacemaker/ICD clinic that follows you. We appreciate you staying remotely connected!     Sincerely,    Harmony Deng RN, BSN  Device Coordinator RN  [facility]  [phone]  AS PXBYWTI  [phone]

## 2022-09-28 NOTE — PROGRESS NOTES
Chargeable vvi pm remote    Normal pacer function with 95% RV pacing. See scanned report in  for details.

## 2023-01-26 ENCOUNTER — CLINICAL SUPPORT (OUTPATIENT)
Dept: CARDIOLOGY CLINIC | Age: 68
End: 2023-01-26
Payer: MEDICARE

## 2023-01-26 ENCOUNTER — OFFICE VISIT (OUTPATIENT)
Dept: CARDIOLOGY CLINIC | Age: 68
End: 2023-01-26

## 2023-01-26 VITALS
HEIGHT: 67 IN | BODY MASS INDEX: 26.68 KG/M2 | RESPIRATION RATE: 18 BRPM | HEART RATE: 63 BPM | WEIGHT: 170 LBS | OXYGEN SATURATION: 99 %

## 2023-01-26 DIAGNOSIS — I44.1 SECOND DEGREE AV BLOCK, MOBITZ TYPE II: ICD-10-CM

## 2023-01-26 DIAGNOSIS — I45.2 RBBB (RIGHT BUNDLE BRANCH BLOCK WITH LEFT ANTERIOR FASCICULAR BLOCK): ICD-10-CM

## 2023-01-26 DIAGNOSIS — Z95.0 CARDIAC PACEMAKER IN SITU: Primary | ICD-10-CM

## 2023-01-26 NOTE — PROGRESS NOTES
Cardiac Electrophysiology Office Note     Subjective: Chano West is a 79 y.o. man who presents for follow up, is s/p Biotronik dual chamber pacemaker (DOI 10/29/2015). Battery 6 years left    Device check today shows proper lead & generator function. VDD due to known RA lead dislodgement. 70 bpm    NYHA class I  He works at the [employer] all the times    Echo showed 2020 LVEF 50-55% with mild concentric LVH. Last stress test was in 2014. BP well controlled. He denies chest pain, palpitations, SOB, PND, orthopnea, syncope, or edema. He is still working and has walked often with intentional weight loss         Previous:  Limited echo (05/21/2020): LVEF 50-55%, mild concentric LVH. Echo (05/23/2018): LVEF 50-55%, no RWMA, mild to mod LVH. RV mildly dilated. Mild GUS. Mild MAC. Very mild TR. Known lack of capture of RA lead. CXR confirmed dislodgement in 2015, was hanging in RA for sensing. No lead revision, as VDD mode allowed him to do well. S/p Biotronik dual chamber pacemaker (DOI 10/29/2015) for 1st & 2nd degree AVB, bifascicular block, & bradycardia. S/p renal transplant. 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       multivitamin (ONE A DAY) tablet Take 1 Tab by mouth daily. aspirin delayed-release 81 mg tablet Take 81 mg by mouth daily. amLODIPine (NORVASC) 5 mg tablet TAKE 1 TABLET EVERY DAY 30 Tab 5    metFORMIN (GLUCOPHAGE) 500 mg tablet 500 mg two (2) times a day. predniSONE (DELTASONE) 5 mg tablet 5 mg daily. tacrolimus (PROGRAF) 1 mg capsule Take 2 mg by mouth every twelve (12) hours. Anti rejection drug - 3 mg      b complex-vitamin c-folic acid 5mg (FOLBEE PLUS) tab tablet Take 1 Tab by mouth daily. No Known Allergies  Past Medical History:   Diagnosis Date    A-V fistula ([facility] 2010    Right    Cancer [facility])     prostate - radiation    Chronic kidney disease     Chronic pain     Diabetes ([facility]     h/o Gastric band     h/o successful Renal transplant 2014    Hypertension     Pacemaker 10/2015     Past Surgical History:   Procedure Laterality Date    COLONOSCOPY N/A 04/25/2017    COLONOSCOPY performed by Donnie Villareal MD at [address] [place] [address] N/A 8/30/2022    COLONOSCOPY performed by Linda Castañeda MD at [facility] ENDOSCOPY    [address]      left knee replacement    HX PACEMAKER  10/29/2015    not a defrillator    HX RENAL TRANSPLANT  02/16/2014    [facility]    HX RENAL TRANSPLANT Right 02/2014    INS PPM/ICD LED DUAL ONLY  10/29/2015         PENILE PUMP      twice - last 2/2017    VT LAPS GASTRIC RESTRICTIVE PROCEDURE PLACE DEVICE  08/2008     No family history of pacemaker   Social History     Tobacco Use    Smoking status: Never    Smokeless tobacco: Never   Substance Use Topics    Alcohol use: No        Review of Systems: All other review of systems otherwise negative. Constitutional: Negative for fever, chills, weight loss   HEENT: Negative for nosebleeds, vision changes. Respiratory: Negative for cough, hemoptysis, sputum production, and wheezing. Cardiovascular: Negative for chest pain, palpitations, orthopnea, claudication, leg swelling, syncope, and PND.     Gastrointestinal: Negative for nausea, vomiting, diarrhea, constipation, blood in stool and melena. Genitourinary: Negative for dysuria, and hematuria. Musculoskeletal: Negative for myalgias, arthralgia . Skin: Negative for rash. Heme: Does not bleed or bruise easily. Neurological: Negative for speech change and focal weakness     Objective:     Visit Vitals  Pulse 63   Resp 18   Ht 5' 7\" (1.702 m)   Wt 170 lb (77.1 kg)   SpO2 99%   BMI 26.63 kg/m²      Physical Exam:   Constitutional: Well-developed and well-nourished. No distress. Head: Normocephalic and atraumatic. Eyes: Pupils are equal, round. Neck: Supple. No JVD present. Cardiovascular: Normal rate, regular rhythm and normal heart sounds. Exam reveals no gallop and no friction rub. no murmur. Pulmonary/Chest: Effort normal and breath sounds normal. No wheezes. Abdominal: Soft, no tenderness. Musculoskeletal: Moves extremities independently. Normal gait. Vasc/lymphatic: No edema. right arm old AV fistula. Neurological: Alert,oriented. Skin: Skin is warm and dry. Left side pacemaker site healed/unremarkable. Psychiatric: Normal mood and affect. Behavior is normal. Judgment and thought content normal.          Assessment/Plan:       ICD-10-CM ICD-9-CM    1. Cardiac pacemaker in situ  Z95.0 V45.01       2. RBBB (right bundle branch block with left anterior fascicular block)  I45.2 426.52 AMB POC EKG ROUTINE W/ 12 LEADS, INTER & REP      3. Second degree AV block, Mobitz type II  I44.1 426.12          ECG ventricular pacing  atrial sensing with known dislodged RA lead    Biotronik dual chamber pacemaker (DOI 10/29/2015) check today shows proper lead & generator function. Generator longevity estimated 6 yrs  Hx of Occasional PAT but no NSVT  He is asymptomatic  Last echo in 05/2020 showed 50-55% LVEF. Last stress test in 2014.     No CHF, will continue to monitor   If LVEF is down further will recommend biv pacer upgrade with RA lead extraction and reimplant  He agrees but now he said he feels fine         Remote pacer checks q 3 months. EP clinic follow up in 1 year. Future Appointments   Date Time Provider [place]   5/1/2023  4:30 PM Tucker Waldron BS AMB   2/6/2024 12:40 PM PACEMAKER3, ELSY YATES BS AMB   2/6/2024  1:00 PM MD Mary Manning M.D.   Electrophysiology/Cardiology  [facility] [place]  [address], [facility] [address] [place] [address]  [phone]

## 2023-01-26 NOTE — PROGRESS NOTES
C/ DC PM clinic check. Device functioning appropriately as programmed. See scanned documents in media manger.

## 2023-09-05 ENCOUNTER — TELEPHONE (OUTPATIENT)
Age: 68
End: 2023-09-05

## 2023-12-23 ENCOUNTER — APPOINTMENT (OUTPATIENT)
Facility: HOSPITAL | Age: 68
End: 2023-12-23
Payer: MEDICARE

## 2023-12-23 ENCOUNTER — HOSPITAL ENCOUNTER (EMERGENCY)
Facility: HOSPITAL | Age: 68
Discharge: HOME | End: 2023-12-23
Attending: EMERGENCY MEDICINE
Payer: MEDICARE

## 2023-12-23 VITALS
BODY MASS INDEX: 26.99 KG/M2 | SYSTOLIC BLOOD PRESSURE: 135 MMHG | DIASTOLIC BLOOD PRESSURE: 74 MMHG | WEIGHT: 171.96 LBS | HEART RATE: 66 BPM | OXYGEN SATURATION: 100 % | TEMPERATURE: 97.5 F | HEIGHT: 67 IN | RESPIRATION RATE: 18 BRPM

## 2023-12-23 DIAGNOSIS — S39.012A BACK STRAIN, INITIAL ENCOUNTER: Primary | ICD-10-CM

## 2023-12-23 DIAGNOSIS — M47.816 ARTHRITIS OF LUMBAR SPINE: ICD-10-CM

## 2023-12-23 LAB
APPEARANCE UR: CLEAR
BACTERIA URNS QL MICRO: NEGATIVE /HPF
BILIRUB UR QL: NEGATIVE
CAOX CRY URNS QL MICRO: ABNORMAL
COLOR UR: ABNORMAL
EPITH CASTS URNS QL MICRO: ABNORMAL /LPF
GLUCOSE UR STRIP.AUTO-MCNC: NEGATIVE MG/DL
HGB UR QL STRIP: NEGATIVE
KETONES UR QL STRIP.AUTO: NEGATIVE MG/DL
LEUKOCYTE ESTERASE UR QL STRIP.AUTO: NEGATIVE
NITRITE UR QL STRIP.AUTO: NEGATIVE
PH UR STRIP: 5 (ref 5–8)
PROT UR STRIP-MCNC: NEGATIVE MG/DL
RBC #/AREA URNS HPF: ABNORMAL /HPF (ref 0–5)
SP GR UR REFRACTOMETRY: 1.03
URINE CULTURE IF INDICATED: ABNORMAL
UROBILINOGEN UR QL STRIP.AUTO: 1 EU/DL (ref 0.2–1)
WBC URNS QL MICRO: ABNORMAL /HPF (ref 0–4)

## 2023-12-23 PROCEDURE — 6370000000 HC RX 637 (ALT 250 FOR IP): Performed by: EMERGENCY MEDICINE

## 2023-12-23 PROCEDURE — 99284 EMERGENCY DEPT VISIT MOD MDM: CPT

## 2023-12-23 PROCEDURE — 81001 URINALYSIS AUTO W/SCOPE: CPT

## 2023-12-23 PROCEDURE — 74176 CT ABD & PELVIS W/O CONTRAST: CPT

## 2023-12-23 RX ORDER — ACETAMINOPHEN 500 MG
1000 TABLET ORAL
Status: COMPLETED | OUTPATIENT
Start: 2023-12-23 | End: 2023-12-23

## 2023-12-23 RX ORDER — HYDROCODONE BITARTRATE AND ACETAMINOPHEN 5; 325 MG/1; MG/1
1 TABLET ORAL EVERY 6 HOURS PRN
Qty: 10 TABLET | Refills: 0 | Status: SHIPPED | OUTPATIENT
Start: 2023-12-23 | End: 2023-12-26

## 2023-12-23 RX ORDER — LIDOCAINE 4 G/G
1 PATCH TOPICAL
Status: DISCONTINUED | OUTPATIENT
Start: 2023-12-23 | End: 2023-12-23 | Stop reason: HOSPADM

## 2023-12-23 RX ADMIN — ACETAMINOPHEN 1000 MG: 500 TABLET ORAL at 13:26

## 2023-12-23 NOTE — ED PROVIDER NOTES
[facility] EMERGENCY DEPT  EMERGENCY DEPARTMENT ENCOUNTER       Pt Name: Lisa Redman  MRN: [id number]  [address] 1955  Date of evaluation: 12/23/2023  Provider: Suleman Arenas MD   PCP: Moreno Paige MD  Note Started: 1:05 PM EST 12/23/23     CHIEF COMPLAINT       Chief Complaint   Patient presents with    Back Pain     Pt ambulatory for triage w/ cc back pain x 2 weeks. Pt denies any injuries to back, notes radiating pain down bilateral lower extrem and to neck with movement. Pt denies any numbness/tingling to BLE. Pt s/p R renal transplant 2014          HISTORY OF PRESENT ILLNESS: 1 or more elements      History From: Patient, History limited by: none     Lisa Redman is a 76 y.o. male patient with history of chronic kidney disease, status post right renal transplant, diabetes, hypertension, pacemaker, here for back pain. His symptoms started 2 weeks ago. He has had constant mid back pain, which radiates to his legs and his neck with movement only. Pain is a 10 out of 10 in severity currently. He has not taken any pain medicine at home. He denies dysuria, change in bowel habits, abdominal pain, chest pain, shortness of breath, fever or chills. He denies trauma. Please See MDM for Additional Details of the HPI/PMH  Nursing Notes were all reviewed and agreed with or any disagreements were addressed in the HPI. REVIEW OF SYSTEMS        Positives and Pertinent negatives as per HPI.     PAST HISTORY     Past Medical History:  Past Medical History:   Diagnosis Date    A-V fistula ([address]) 2010    Right    Cancer [facility])     prostate - radiation    Chronic kidney disease     Chronic pain     Diabetes ([address])     Gastric band erosion     Hypertension     Pacemaker 10/2015    Renal transplant disorder 2014       Past Surgical History:  Past Surgical History:   Procedure Laterality Date    CARDIAC SURGERY  10/29/2015         COLONOSCOPY N/A 8/30/2022    COLONOSCOPY performed by Abel Alvarez MD at [facility] should their symptoms worsen. PATIENT REFERRED TO:  Neftali Whiteside MD  [address] [place] [id number] [phone]      As needed    [facility] EMERGENCY DEPT  [address]  [address] [place] [address] [address]  [phone]    If symptoms worsen       DISCHARGE MEDICATIONS:     Medication List        START taking these medications      HYDROcodone-acetaminophen 5-325 MG per tablet  Commonly known as: Norco  Take 1 tablet by mouth every 6 hours as needed for Pain for up to 3 days. Intended supply: 3 days. Take lowest dose possible to manage pain Max Daily Amount: 4 tablets            ASK your doctor about these medications      amLODIPine 5 MG tablet  Commonly known as: NORVASC     aspirin 81 MG EC tablet     atorvastatin 10 MG tablet  Commonly known as: LIPITOR     metFORMIN 500 MG tablet  Commonly known as: GLUCOPHAGE     mycophenolate 250 MG capsule  Commonly known as: CELLCEPT     predniSONE 5 MG tablet  Commonly known as: DELTASONE     tacrolimus 1 MG capsule  Commonly known as: PROGRAF     tamsulosin 0.4 MG capsule  Commonly known as: FLOMAX     vitamin D 50 MCG (2000 UT) Caps capsule  Commonly known as: CHOLECALCIFEROL               Where to Get Your Medications        These medications were sent to [address] [phone] -  [phone]  [address] [place] [address] Road [facility]      Phone: [phone]   HYDROcodone-acetaminophen 5-325 MG per tablet           DISCONTINUED MEDICATIONS:  Current Discharge Medication List          I am the Primary Clinician of Record. Tomas Dorman MD (electronically signed)    (Please note that parts of this dictation were completed with voice recognition software. Quite often unanticipated grammatical, syntax, homophones, and other interpretive errors are inadvertently transcribed by the computer software. Please disregards these errors.  Please excuse any errors that have escaped final proofreading.)

## 2024-04-29 NOTE — PROGRESS NOTES
Cardiac Electrophysiology Office Follow-up    NAME:  Pablo Francisco   :  1955  MRM:  [id number]    Date:  2024     Primary Cardiologist: Bharati    Assessment and Plan:     1. Presence of cardiac pacemaker  2. Atrioventricular block, second degree       Biotronik dual chamber pacemaker (DOI 10/29/2015): Device check today shows proper lead & generator function. Generator longevity estimated 5 years. RV 97%. No sustained VT/VF.  Iterative programming was performed to test the leads sensing and threshold parameters without any permanent changes.  - Atrial sensing with known dislodged RA lead  - Echo 2020 LVEF 50-55%. Last stress test   -  If LVEF is down further will recommend biv pacer upgrade with RA lead extraction and reimplant. He agrees, but currently denies any symptoms and reports feeling well doing all of his normal activities. Likely repeat Echo next year.   - Continue remote device transmissions every 3 months  - Follow-up in EP clinic in 1 year    Hypertension: BP is well controlled on the current regimen. No change in antihypertensive medications today. Recommended routine exercise and low sodium diet.    Diabetes: continue metformin and Semaglutide    Subjective:     Pablo Francisco, a 68 y.o. year-old who presents for followup of Biotronik dual chamber pacemaker (DOI 10/29/2015).      Patient reports feeling well overall, was working in his garden yesterday. Denies chest pain, palpitations, shortness of breath, and dizziness.      Device check today shows proper lead & generator function.  VDD due to known RA lead dislodgement.     Echo showed  LVEF 50-55% with mild concentric LVH.  Last stress test was in .    Previous:  - Limited echo (2020): LVEF 50-55%, mild concentric LVH.  - Echo (2018): LVEF 50-55%, no RWMA, mild to mod LVH.  RV mildly dilated.  Mild JOHN.  Mild MAC.  Very mild TR.  - Known lack of capture of RA lead.  CXR confirmed dislodgement in , was

## 2024-04-30 ENCOUNTER — PROCEDURE VISIT (OUTPATIENT)
Age: 69
End: 2024-04-30

## 2024-04-30 ENCOUNTER — OFFICE VISIT (OUTPATIENT)
Age: 69
End: 2024-04-30
Payer: MEDICARE

## 2024-04-30 VITALS
OXYGEN SATURATION: 98 % | WEIGHT: 166 LBS | HEART RATE: 60 BPM | SYSTOLIC BLOOD PRESSURE: 120 MMHG | RESPIRATION RATE: 14 BRPM | HEIGHT: 67 IN | BODY MASS INDEX: 26.06 KG/M2 | DIASTOLIC BLOOD PRESSURE: 64 MMHG

## 2024-04-30 DIAGNOSIS — Z95.0 PRESENCE OF CARDIAC PACEMAKER: Primary | ICD-10-CM

## 2024-04-30 DIAGNOSIS — I44.1 ATRIOVENTRICULAR BLOCK, SECOND DEGREE: ICD-10-CM

## 2024-04-30 PROCEDURE — 99213 OFFICE O/P EST LOW 20 MIN: CPT

## 2024-04-30 PROCEDURE — 1123F ACP DISCUSS/DSCN MKR DOCD: CPT

## 2024-07-21 PROCEDURE — 93294 REM INTERROG EVL PM/LDLS PM: CPT | Performed by: INTERNAL MEDICINE

## 2024-10-17 ENCOUNTER — CLINICAL DOCUMENTATION (OUTPATIENT)
Age: 69
End: 2024-10-17

## 2024-10-17 NOTE — PROGRESS NOTES
Faxed pre op med management plan to [facility], PAT dept at fax number [phone].  Fax confirmation received.

## 2024-12-03 ENCOUNTER — TELEPHONE (OUTPATIENT)
Age: 69
End: 2024-12-03

## 2024-12-03 NOTE — TELEPHONE ENCOUNTER
Called patient to discuss appointment on 12/4 with Ирина Gordon NP but no answer.  Left voicemail with request for return call.

## 2024-12-04 ENCOUNTER — PROCEDURE VISIT (OUTPATIENT)
Age: 69
End: 2024-12-04

## 2024-12-04 ENCOUNTER — TELEPHONE (OUTPATIENT)
Age: 69
End: 2024-12-04

## 2024-12-04 ENCOUNTER — OFFICE VISIT (OUTPATIENT)
Age: 69
End: 2024-12-04
Payer: MEDICARE

## 2024-12-04 VITALS
DIASTOLIC BLOOD PRESSURE: 70 MMHG | HEART RATE: 76 BPM | RESPIRATION RATE: 18 BRPM | OXYGEN SATURATION: 99 % | WEIGHT: 155.6 LBS | HEIGHT: 67 IN | BODY MASS INDEX: 24.42 KG/M2 | SYSTOLIC BLOOD PRESSURE: 122 MMHG

## 2024-12-04 DIAGNOSIS — Z95.0 S/P PLACEMENT OF CARDIAC PACEMAKER: Primary | ICD-10-CM

## 2024-12-04 DIAGNOSIS — Z95.0 PRESENCE OF CARDIAC PACEMAKER: Primary | ICD-10-CM

## 2024-12-04 DIAGNOSIS — I10 PRIMARY HYPERTENSION: ICD-10-CM

## 2024-12-04 DIAGNOSIS — R06.02 SHORTNESS OF BREATH: ICD-10-CM

## 2024-12-04 PROCEDURE — 99214 OFFICE O/P EST MOD 30 MIN: CPT | Performed by: NURSE PRACTITIONER

## 2024-12-04 PROCEDURE — 93005 ELECTROCARDIOGRAM TRACING: CPT | Performed by: NURSE PRACTITIONER

## 2024-12-04 NOTE — PROGRESS NOTES
had concerns including av Block and Hypertension.    Vitals:    12/04/24 0914   BP: 122/70   Site: Left Upper Arm   Position: Sitting   Pulse: 76   Resp: 18   SpO2: 99%   Weight: 70.6 kg (155 lb 9.6 oz)   Height: 1.702 m (5' 7\")        Chest pain No    Refills No        1. Have you been to the ER, urgent care clinic since your last visit? No       Hospitalized since your last visit? No       Where?        Date?  
  Component Value Date/Time    [facility] 15.0 05/17/2022 12:02 PM     Lab Results   Component Value Date/Time     05/17/2022 12:02 PM                  ___________________________________________________    Ирина Gordon NP    Cardiac Electrophysiology  [facility]   [address] [facility] [address]  [place] [address] [phone]

## 2024-12-05 ENCOUNTER — TELEPHONE (OUTPATIENT)
Age: 69
End: 2024-12-05

## 2024-12-05 NOTE — TELEPHONE ENCOUNTER
Per Sarah Gaona rep, no data came through via home monitoring. A new home monitor will be ordered.    Placed a call to patient. ID verified using two patient identifiers. Patient has been advised of the above.    Opportunities for questions, clarifications, and concerns provided.  Pt expressed understanding.

## 2024-12-26 ENCOUNTER — TELEPHONE (OUTPATIENT)
Age: 69
End: 2024-12-26

## 2024-12-26 NOTE — TELEPHONE ENCOUNTER
Called pt regarding remote transmissions. ID verified using two patient identifiers. Pt states he received his new home monitor about 2 weeks ago and it has been set up since then. Alerted by [employer] that no messages have been received. Pt instructed to call [employer] for advanced troubleshooting-phone number provided.     Echo appointment also confirmed while on phone with pt.    Opportunities for questions, clarifications, and concerns provided.  Pt expressed understanding.

## 2024-12-26 NOTE — TELEPHONE ENCOUNTER
Spoke with Sarah Gaona, updated on communication issue with home monitor. Rep offered to go to pts home for reset-pt in agreement. Address on file confirmed. Awaiting update.

## 2024-12-29 PROCEDURE — 93294 REM INTERROG EVL PM/LDLS PM: CPT | Performed by: INTERNAL MEDICINE

## 2024-12-31 ENCOUNTER — ANCILLARY PROCEDURE (OUTPATIENT)
Age: 69
End: 2024-12-31
Payer: MEDICARE

## 2024-12-31 VITALS
HEART RATE: 74 BPM | HEIGHT: 67 IN | DIASTOLIC BLOOD PRESSURE: 74 MMHG | BODY MASS INDEX: 24.33 KG/M2 | WEIGHT: 155 LBS | SYSTOLIC BLOOD PRESSURE: 120 MMHG

## 2024-12-31 DIAGNOSIS — R06.02 SHORTNESS OF BREATH: ICD-10-CM

## 2024-12-31 LAB
ECHO AO ASC DIAM: 3.7 CM
ECHO AO ASCENDING AORTA INDEX: 2.04 CM/M2
ECHO AO ROOT DIAM: 4 CM
ECHO AO ROOT INDEX: 2.21 CM/M2
ECHO AV AREA PEAK VELOCITY: 3.3 CM2
ECHO AV AREA VTI: 3.3 CM2
ECHO AV AREA/BSA PEAK VELOCITY: 1.8 CM2/M2
ECHO AV AREA/BSA VTI: 1.8 CM2/M2
ECHO AV MEAN GRADIENT: 5 MMHG
ECHO AV MEAN VELOCITY: 1 M/S
ECHO AV PEAK GRADIENT: 8 MMHG
ECHO AV PEAK VELOCITY: 1.4 M/S
ECHO AV VELOCITY RATIO: 0.86
ECHO AV VTI: 26.3 CM
ECHO BSA: 1.82 M2
ECHO LA DIAMETER INDEX: 2.27 CM/M2
ECHO LA DIAMETER: 4.1 CM
ECHO LA TO AORTIC ROOT RATIO: 1.03
ECHO LA VOL A-L A2C: 78 ML (ref 18–58)
ECHO LA VOL A-L A4C: 77 ML (ref 18–58)
ECHO LA VOL BP: 72 ML (ref 18–58)
ECHO LA VOL MOD A2C: 73 ML (ref 18–58)
ECHO LA VOL MOD A4C: 69 ML (ref 18–58)
ECHO LA VOL/BSA BIPLANE: 40 ML/M2 (ref 16–34)
ECHO LA VOLUME AREA LENGTH: 79 ML
ECHO LA VOLUME INDEX A-L A2C: 43 ML/M2 (ref 16–34)
ECHO LA VOLUME INDEX A-L A4C: 43 ML/M2 (ref 16–34)
ECHO LA VOLUME INDEX AREA LENGTH: 44 ML/M2 (ref 16–34)
ECHO LA VOLUME INDEX MOD A2C: 40 ML/M2 (ref 16–34)
ECHO LA VOLUME INDEX MOD A4C: 38 ML/M2 (ref 16–34)
ECHO LV E' LATERAL VELOCITY: 8.75 CM/S
ECHO LV EDV A2C: 127 ML
ECHO LV EDV A4C: 151 ML
ECHO LV EDV BP: 142 ML (ref 67–155)
ECHO LV EDV INDEX A4C: 83 ML/M2
ECHO LV EDV INDEX BP: 78 ML/M2
ECHO LV EDV NDEX A2C: 70 ML/M2
ECHO LV EJECTION FRACTION A2C: 54 %
ECHO LV EJECTION FRACTION A4C: 49 %
ECHO LV EJECTION FRACTION BIPLANE: 52 % (ref 55–100)
ECHO LV ESV A2C: 59 ML
ECHO LV ESV A4C: 78 ML
ECHO LV ESV BP: 68 ML (ref 22–58)
ECHO LV ESV INDEX A2C: 33 ML/M2
ECHO LV ESV INDEX A4C: 43 ML/M2
ECHO LV ESV INDEX BP: 38 ML/M2
ECHO LV FRACTIONAL SHORTENING: 29 % (ref 28–44)
ECHO LV INTERNAL DIMENSION DIASTOLE INDEX: 2.49 CM/M2
ECHO LV INTERNAL DIMENSION DIASTOLIC: 4.5 CM (ref 4.2–5.9)
ECHO LV INTERNAL DIMENSION SYSTOLIC INDEX: 1.77 CM/M2
ECHO LV INTERNAL DIMENSION SYSTOLIC: 3.2 CM
ECHO LV IVSD: 1.4 CM (ref 0.6–1)
ECHO LV MASS 2D: 222.6 G (ref 88–224)
ECHO LV MASS INDEX 2D: 123 G/M2 (ref 49–115)
ECHO LV POSTERIOR WALL DIASTOLIC: 1.2 CM (ref 0.6–1)
ECHO LV RELATIVE WALL THICKNESS RATIO: 0.53
ECHO LVOT AREA: 3.8 CM2
ECHO LVOT AV VTI INDEX: 0.87
ECHO LVOT DIAM: 2.2 CM
ECHO LVOT MEAN GRADIENT: 3 MMHG
ECHO LVOT PEAK GRADIENT: 6 MMHG
ECHO LVOT PEAK VELOCITY: 1.2 M/S
ECHO LVOT STROKE VOLUME INDEX: 48.1 ML/M2
ECHO LVOT SV: 87 ML
ECHO LVOT VTI: 22.9 CM
ECHO RV FREE WALL PEAK S': 14.1 CM/S
ECHO RV INTERNAL DIMENSION: 4.2 CM
ECHO RV TAPSE: 2 CM (ref 1.7–?)

## 2024-12-31 PROCEDURE — 93306 TTE W/DOPPLER COMPLETE: CPT | Performed by: INTERNAL MEDICINE

## 2025-01-02 ENCOUNTER — TELEPHONE (OUTPATIENT)
Age: 70
End: 2025-01-02

## 2025-01-02 DIAGNOSIS — R53.83 FATIGUE: ICD-10-CM

## 2025-01-02 DIAGNOSIS — I44.1 ATRIOVENTRICULAR BLOCK, SECOND DEGREE: ICD-10-CM

## 2025-01-02 DIAGNOSIS — R06.02 SHORTNESS OF BREATH: Primary | ICD-10-CM

## 2025-01-02 NOTE — TELEPHONE ENCOUNTER
----- Message from ZHANNA Roach NP sent at 1/2/2025  2:13 PM EST -----  EF normal and doesn't explain his symptoms. How is he feeling?  ----- Message -----  From: Osei Calabrese MD  Sent: 12/31/2024  11:11 AM EST  To: ZHANNA Roach NP

## 2025-01-02 NOTE — TELEPHONE ENCOUNTER
If he hasn't had a TSH checked in the past 6 months we can check that in addition to CBC.  I don't see that he's had an ischemic eval, so we can check a nuclear stress test as well.  Has he been evaluated for sleep apnea?  We could refer for that if he's interested.    If all of that checks out, would then recommend pursuing further eval with PCP.

## 2025-01-02 NOTE — TELEPHONE ENCOUNTER
Verified patient with two types of identifiers.   Spoke with patient and advised of echo results. Patient states that he is still fatigued, has good days and bad days. He denied any further symptoms. No CP, Palps, Quiroz, weight gain. He has followed up with PCP and states that he doesn't seem to be getting anywhere. He questions whether there are any next steps to determine why he is feeling this way.   Informed him I would touch base with NP/MD and return call with any further recommendations.   Patient verbalized understanding and will call with any other questions.

## 2025-01-03 NOTE — TELEPHONE ENCOUNTER
Verified patient with two types of identifiers.   Spoke with patient in regards to NP recommendations. He states that he will go for labs but would like to think on scheduling NST. He will call back to schedule when he is ready.

## 2025-05-01 ENCOUNTER — PROCEDURE VISIT (OUTPATIENT)
Age: 70
End: 2025-05-01
Payer: MEDICARE

## 2025-05-01 ENCOUNTER — OFFICE VISIT (OUTPATIENT)
Age: 70
End: 2025-05-01
Payer: MEDICARE

## 2025-05-01 VITALS
OXYGEN SATURATION: 97 % | BODY MASS INDEX: 26.21 KG/M2 | DIASTOLIC BLOOD PRESSURE: 80 MMHG | HEART RATE: 70 BPM | HEIGHT: 67 IN | WEIGHT: 167 LBS | SYSTOLIC BLOOD PRESSURE: 120 MMHG

## 2025-05-01 DIAGNOSIS — I44.2 COMPLETE AV BLOCK (HCC): ICD-10-CM

## 2025-05-01 DIAGNOSIS — R53.83 FATIGUE, UNSPECIFIED TYPE: ICD-10-CM

## 2025-05-01 DIAGNOSIS — Z95.0 PRESENCE OF CARDIAC PACEMAKER: Primary | ICD-10-CM

## 2025-05-01 DIAGNOSIS — T82.120S: ICD-10-CM

## 2025-05-01 PROCEDURE — 99214 OFFICE O/P EST MOD 30 MIN: CPT | Performed by: INTERNAL MEDICINE

## 2025-05-01 PROCEDURE — 1160F RVW MEDS BY RX/DR IN RCRD: CPT | Performed by: INTERNAL MEDICINE

## 2025-05-01 PROCEDURE — 1159F MED LIST DOCD IN RCRD: CPT | Performed by: INTERNAL MEDICINE

## 2025-05-01 PROCEDURE — 1126F AMNT PAIN NOTED NONE PRSNT: CPT | Performed by: INTERNAL MEDICINE

## 2025-05-01 PROCEDURE — 1123F ACP DISCUSS/DSCN MKR DOCD: CPT | Performed by: INTERNAL MEDICINE

## 2025-05-01 RX ORDER — BLOOD SUGAR DIAGNOSTIC
STRIP MISCELLANEOUS
COMMUNITY
Start: 2025-03-11

## 2025-05-01 RX ORDER — MULTIVITAMIN
1 TABLET ORAL DAILY
COMMUNITY

## 2025-05-01 RX ORDER — HYDROCODONE BITARTRATE AND ACETAMINOPHEN 7.5; 325 MG/1; MG/1
1 TABLET ORAL 2 TIMES DAILY PRN
COMMUNITY
Start: 2025-04-01

## 2025-05-01 RX ORDER — FOLIC ACID/VIT B COMPLEX AND C 5 MG
1 TABLET ORAL DAILY
COMMUNITY

## 2025-05-01 RX ORDER — SEMAGLUTIDE 0.68 MG/ML
INJECTION, SOLUTION SUBCUTANEOUS
COMMUNITY

## 2025-05-01 RX ORDER — CINACALCET 30 MG/1
60 TABLET, FILM COATED ORAL DAILY
COMMUNITY

## 2025-05-01 ASSESSMENT — PATIENT HEALTH QUESTIONNAIRE - PHQ9
SUM OF ALL RESPONSES TO PHQ QUESTIONS 1-9: 0
1. LITTLE INTEREST OR PLEASURE IN DOING THINGS: NOT AT ALL
SUM OF ALL RESPONSES TO PHQ QUESTIONS 1-9: 0
SUM OF ALL RESPONSES TO PHQ QUESTIONS 1-9: 0
2. FEELING DOWN, DEPRESSED OR HOPELESS: NOT AT ALL
SUM OF ALL RESPONSES TO PHQ QUESTIONS 1-9: 0

## 2025-05-01 NOTE — PROGRESS NOTES
tobacco. He reports that he does not drink alcohol and does not use drugs.   Family Hx: family history is not on file.   No Known Allergies     Exam:     Physical Exam:     Vitals:    05/01/25 1035   BP: 120/80   Pulse: 70   SpO2: 97%       PHYSICAL EXAM   General:  Alert and oriented, in no acute distress   Head:  Atraumatic, normocephalic   Eyes:  extraocular muscles intact   Neck:  Supple, normal range of motion   Lungs:  Clear to auscultation bilaterally, no wheezes/rales/rhonchi   Cardiovascular:  Regular rate and rhythm,  no murmurs/rubs/gallops   Abdomen:  Soft, nontender, nondistended   Skin:  Intact, no rash. Device site well healed.   Extremities:, no clubbing, cyanosis, or edema   Musculoskeletal: normal range of motion   Neurological:  Alert and oriented, no focal neurologic deficits   Psychiatric:  Normal mood and affect         Medications:     Current Outpatient Medications   Medication Sig   · folbee plus (FOLBEE PLUS) TABS Take 1 tablet by mouth daily   · Semaglutide,0.25 or 0.5MG/DOS, (OZEMPIC, 0.25 OR 0.5 MG/DOSE,) 2 MG/3ML SOPN INJECT 0.5 MG UNDER THE SKIN 1 TIME PER WEEK.   · HYDROcodone-acetaminophen (NORCO) 7.5-325 MG per tablet Take 1 tablet by mouth 2 times daily as needed.   · ONETOUCH ULTRA strip CHECK 3 TIMES DAILY   · cinacalcet (SENSIPAR) 30 MG tablet Take 2 tablets by mouth daily   · amLODIPine (NORVASC) 5 MG tablet TAKE 1 TABLET EVERY DAY   · aspirin 81 MG EC tablet Take by mouth daily   · atorvastatin (LIPITOR) 10 MG tablet ceived the following from [facility]: Outside name: atorvastatin (LIPITOR) 10 mg tablet   · Cholecalciferol 50 MCG (2000 UT) CAPS 1 capsule   · metFORMIN (GLUCOPHAGE) 500 MG tablet 2 times daily   · mycophenolate (CELLCEPT) 250 MG capsule Take 1 capsule by mouth 2 times daily ceived the following from [facility]: Outside name: mycophenolate mofetil (CELLCEPT) 250 mg capsule   · predniSONE (DELTASONE) 5 MG tablet Take 1 tablet by

## (undated) DEVICE — TRAP SURG QUAD PARABOLA SLOT DSGN SFTY SCRN TRAPEASE

## (undated) DEVICE — NEONATAL-ADULT SPO2 SENSOR: Brand: NELLCOR

## (undated) DEVICE — BAG BELONG PT PERS CLEAR HANDL

## (undated) DEVICE — BW-412T DISP COMBO CLEANING BRUSH: Brand: SINGLE USE COMBINATION CLEANING BRUSH

## (undated) DEVICE — Device

## (undated) DEVICE — 1200 GUARD II KIT W/5MM TUBE W/O VAC TUBE: Brand: GUARDIAN

## (undated) DEVICE — Z DISCONTINUED USE 2751540 TUBING IRRIG L10IN DISP PMP ENDOGATOR

## (undated) DEVICE — SYRINGE MED 20ML STD CLR PLAS LUERLOCK TIP N CTRL DISP

## (undated) DEVICE — FORCEPS BX L240CM JAW DIA2.8MM L CAP W/ NDL MIC MESH TOOTH

## (undated) DEVICE — QUILTED PREMIUM COMFORT UNDERPAD,EXTRA HEAVY: Brand: WINGS

## (undated) DEVICE — CATH IV AUTOGRD BC BLU 22GA 25 -- INSYTE

## (undated) DEVICE — SOLIDIFIER FLUID 3000 CC ABSORB

## (undated) DEVICE — SET ADMIN 16ML TBNG L100IN 2 Y INJ SITE IV PIGGY BK DISP

## (undated) DEVICE — CONNECTOR TBNG AUX H2O JET DISP FOR OLY 160/180 SER

## (undated) DEVICE — KIT IV STRT W CHLORAPREP PD 1ML

## (undated) DEVICE — BAG SPEC BIOHZD LF 2MIL 6X10IN -- CONVERT TO ITEM 357326

## (undated) DEVICE — SNARE VASC L240CM LOOP W10MM SHTH DIA2.4MM RND STIFF CLD

## (undated) DEVICE — SET EXTN TBNG L BOR 4 W STPCOCK ST 32IN PRIMING VOL 6ML

## (undated) DEVICE — AIRLIFE™ U/CONNECT-IT OXYGEN TUBING 7 FEET (2.1 M) CRUSH-RESISTANT OXYGEN TUBING, VINYL TIPPED: Brand: AIRLIFE™

## (undated) DEVICE — KENDALL RADIOLUCENT FOAM MONITORING ELECTRODE -RECTANGULAR SHAPE: Brand: KENDALL

## (undated) DEVICE — CONTAINER SPEC 20 ML LID NEUT BUFF FORMALIN 10 % POLYPR STS

## (undated) DEVICE — ENDO CARRY-ON PROCEDURE KIT INCLUDES ENZYMATIC SPONGE, GAUZE, BIOHAZARD LABEL, TRAY, LUBRICANT, DIRTY SCOPE LABEL, WATER LABEL, TRAY, DRAWSTRING PAD, AND DEFENDO 4-PIECE KIT.: Brand: ENDO CARRY-ON PROCEDURE KIT